# Patient Record
Sex: FEMALE | Race: OTHER
[De-identification: names, ages, dates, MRNs, and addresses within clinical notes are randomized per-mention and may not be internally consistent; named-entity substitution may affect disease eponyms.]

---

## 2018-11-29 ENCOUNTER — HOSPITAL ENCOUNTER (INPATIENT)
Dept: HOSPITAL 8 - OR | Age: 73
LOS: 1 days | Discharge: HOME HEALTH | DRG: 480 | End: 2018-11-30
Attending: HOSPITALIST | Admitting: HOSPITALIST
Payer: MEDICARE

## 2018-11-29 VITALS — HEIGHT: 65 IN | WEIGHT: 163.14 LBS | BODY MASS INDEX: 27.18 KG/M2

## 2018-11-29 VITALS — SYSTOLIC BLOOD PRESSURE: 127 MMHG | DIASTOLIC BLOOD PRESSURE: 77 MMHG

## 2018-11-29 VITALS — SYSTOLIC BLOOD PRESSURE: 117 MMHG | DIASTOLIC BLOOD PRESSURE: 52 MMHG

## 2018-11-29 DIAGNOSIS — Z88.1: ICD-10-CM

## 2018-11-29 DIAGNOSIS — M06.9: ICD-10-CM

## 2018-11-29 DIAGNOSIS — M89.8X8: ICD-10-CM

## 2018-11-29 DIAGNOSIS — I10: ICD-10-CM

## 2018-11-29 DIAGNOSIS — E44.0: ICD-10-CM

## 2018-11-29 DIAGNOSIS — G89.29: ICD-10-CM

## 2018-11-29 DIAGNOSIS — Z88.6: ICD-10-CM

## 2018-11-29 DIAGNOSIS — Z79.899: ICD-10-CM

## 2018-11-29 DIAGNOSIS — Z88.8: ICD-10-CM
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
## 2018-11-29 DIAGNOSIS — J96.01: ICD-10-CM

## 2018-11-29 DIAGNOSIS — Z88.5: ICD-10-CM

## 2018-11-29 DIAGNOSIS — F41.9: ICD-10-CM

## 2018-11-29 DIAGNOSIS — S72.001A: Primary | ICD-10-CM
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
## 2018-11-29 LAB
ALBUMIN SERPL-MCNC: 3 G/DL (ref 3.4–5)
ANION GAP SERPL CALC-SCNC: 8 MMOL/L (ref 5–15)
BASOPHILS # BLD AUTO: 0.02 X10^3/UL (ref 0–0.1)
BASOPHILS NFR BLD AUTO: 0 % (ref 0–1)
CALCIUM SERPL-MCNC: 8.7 MG/DL (ref 8.5–10.1)
CHLORIDE SERPL-SCNC: 109 MMOL/L (ref 98–107)
CREAT SERPL-MCNC: 0.47 MG/DL (ref 0.55–1.02)
EOSINOPHIL # BLD AUTO: 0.03 X10^3/UL (ref 0–0.4)
EOSINOPHIL NFR BLD AUTO: 0 % (ref 1–7)
ERYTHROCYTE [DISTWIDTH] IN BLOOD BY AUTOMATED COUNT: 14.4 % (ref 9.6–15.2)
EST. AVERAGE GLUCOSE BLD GHB EST-MCNC: 126 MG/DL (ref 0–126)
HBA1C MFR BLD: 6 % (ref 4.2–6.3)
LYMPHOCYTES # BLD AUTO: 1.8 X10^3/UL (ref 1–3.4)
LYMPHOCYTES NFR BLD AUTO: 19 % (ref 22–44)
MCH RBC QN AUTO: 29.2 PG (ref 27–34.8)
MCHC RBC AUTO-ENTMCNC: 33.5 G/DL (ref 32.4–35.8)
MCV RBC AUTO: 87.1 FL (ref 80–100)
MD: NO
MONOCYTES # BLD AUTO: 0.32 X10^3/UL (ref 0.2–0.8)
MONOCYTES NFR BLD AUTO: 3 % (ref 2–9)
NEUTROPHILS # BLD AUTO: 7.26 X10^3/UL (ref 1.8–6.8)
NEUTROPHILS NFR BLD AUTO: 77 % (ref 42–75)
PLATELET # BLD AUTO: 208 X10^3/UL (ref 130–400)
PMV BLD AUTO: 8.1 FL (ref 7.4–10.4)
RBC # BLD AUTO: 4.91 X10^6/UL (ref 3.82–5.3)
T4 FREE SERPL-MCNC: 1.2 NG/DL (ref 0.76–1.46)
TSH SERPL-ACNC: 0.95 MIU/L (ref 0.36–3.74)

## 2018-11-29 PROCEDURE — 83036 HEMOGLOBIN GLYCOSYLATED A1C: CPT

## 2018-11-29 PROCEDURE — 93005 ELECTROCARDIOGRAM TRACING: CPT

## 2018-11-29 PROCEDURE — 84443 ASSAY THYROID STIM HORMONE: CPT

## 2018-11-29 PROCEDURE — 83735 ASSAY OF MAGNESIUM: CPT

## 2018-11-29 PROCEDURE — C1769 GUIDE WIRE: HCPCS

## 2018-11-29 PROCEDURE — 0QH634Z INSERTION OF INTERNAL FIXATION DEVICE INTO RIGHT UPPER FEMUR, PERCUTANEOUS APPROACH: ICD-10-PCS | Performed by: ORTHOPAEDIC SURGERY

## 2018-11-29 PROCEDURE — 80061 LIPID PANEL: CPT

## 2018-11-29 PROCEDURE — 82040 ASSAY OF SERUM ALBUMIN: CPT

## 2018-11-29 PROCEDURE — C1713 ANCHOR/SCREW BN/BN,TIS/BN: HCPCS

## 2018-11-29 PROCEDURE — 71045 X-RAY EXAM CHEST 1 VIEW: CPT

## 2018-11-29 PROCEDURE — 36415 COLL VENOUS BLD VENIPUNCTURE: CPT

## 2018-11-29 PROCEDURE — 80053 COMPREHEN METABOLIC PANEL: CPT

## 2018-11-29 PROCEDURE — 84439 ASSAY OF FREE THYROXINE: CPT

## 2018-11-29 PROCEDURE — 80048 BASIC METABOLIC PNL TOTAL CA: CPT

## 2018-11-29 PROCEDURE — 76001: CPT

## 2018-11-29 PROCEDURE — 85025 COMPLETE CBC W/AUTO DIFF WBC: CPT

## 2018-11-29 RX ADMIN — FENTANYL CITRATE PRN MCG: 50 INJECTION INTRAMUSCULAR; INTRAVENOUS at 19:11

## 2018-11-29 RX ADMIN — FENTANYL CITRATE PRN MCG: 50 INJECTION INTRAMUSCULAR; INTRAVENOUS at 19:04

## 2018-11-29 RX ADMIN — SODIUM CHLORIDE SCH MLS/HR: 0.9 INJECTION, SOLUTION INTRAVENOUS at 15:14

## 2018-11-30 VITALS — SYSTOLIC BLOOD PRESSURE: 98 MMHG | DIASTOLIC BLOOD PRESSURE: 62 MMHG

## 2018-11-30 VITALS — SYSTOLIC BLOOD PRESSURE: 112 MMHG | DIASTOLIC BLOOD PRESSURE: 70 MMHG

## 2018-11-30 VITALS — SYSTOLIC BLOOD PRESSURE: 100 MMHG | DIASTOLIC BLOOD PRESSURE: 59 MMHG

## 2018-11-30 VITALS — DIASTOLIC BLOOD PRESSURE: 78 MMHG | SYSTOLIC BLOOD PRESSURE: 115 MMHG

## 2018-11-30 LAB
ALBUMIN SERPL-MCNC: 2.7 G/DL (ref 3.4–5)
ALP SERPL-CCNC: 54 U/L (ref 45–117)
ALT SERPL-CCNC: 35 U/L (ref 12–78)
ANION GAP SERPL CALC-SCNC: 8 MMOL/L (ref 5–15)
BASOPHILS # BLD AUTO: 0.01 X10^3/UL (ref 0–0.1)
BASOPHILS NFR BLD AUTO: 0 % (ref 0–1)
BILIRUB SERPL-MCNC: 0.5 MG/DL (ref 0.2–1)
CALCIUM SERPL-MCNC: 8.3 MG/DL (ref 8.5–10.1)
CHLORIDE SERPL-SCNC: 111 MMOL/L (ref 98–107)
CHOL/HDL RATIO: 3.4
CREAT SERPL-MCNC: 0.43 MG/DL (ref 0.55–1.02)
EOSINOPHIL # BLD AUTO: 0 X10^3/UL (ref 0–0.4)
EOSINOPHIL NFR BLD AUTO: 0 % (ref 1–7)
ERYTHROCYTE [DISTWIDTH] IN BLOOD BY AUTOMATED COUNT: 14.4 % (ref 9.6–15.2)
HDL CHOL %: 29 % (ref 28–40)
HDL CHOLESTEROL (DIRECT): 46 MG/DL (ref 40–60)
LDL CHOLESTEROL,CALCULATED: 102 MG/DL (ref 54–169)
LDLC/HDLC SERPL: 2.2 {RATIO} (ref 0.5–3)
LYMPHOCYTES # BLD AUTO: 0.78 X10^3/UL (ref 1–3.4)
LYMPHOCYTES NFR BLD AUTO: 11 % (ref 22–44)
MCH RBC QN AUTO: 29.8 PG (ref 27–34.8)
MCHC RBC AUTO-ENTMCNC: 34 G/DL (ref 32.4–35.8)
MCV RBC AUTO: 87.9 FL (ref 80–100)
MD: NO
MONOCYTES # BLD AUTO: 0.15 X10^3/UL (ref 0.2–0.8)
MONOCYTES NFR BLD AUTO: 2 % (ref 2–9)
NEUTROPHILS # BLD AUTO: 6.15 X10^3/UL (ref 1.8–6.8)
NEUTROPHILS NFR BLD AUTO: 87 % (ref 42–75)
PLATELET # BLD AUTO: 194 X10^3/UL (ref 130–400)
PMV BLD AUTO: 8.5 FL (ref 7.4–10.4)
PROT SERPL-MCNC: 6.9 G/DL (ref 6.4–8.2)
RBC # BLD AUTO: 4.45 X10^6/UL (ref 3.82–5.3)
TRIGL SERPL-MCNC: 50 MG/DL (ref 50–200)
VLDLC SERPL CALC-MCNC: 10 MG/DL (ref 0–25)

## 2018-11-30 RX ADMIN — CEFAZOLIN SCH MLS/HR: 1 INJECTION, POWDER, FOR SOLUTION INTRAVENOUS at 00:28

## 2018-11-30 RX ADMIN — CEFAZOLIN SCH MLS/HR: 1 INJECTION, POWDER, FOR SOLUTION INTRAVENOUS at 08:42

## 2018-11-30 RX ADMIN — SODIUM CHLORIDE SCH MLS/HR: 0.9 INJECTION, SOLUTION INTRAVENOUS at 05:40

## 2020-03-05 ENCOUNTER — HOSPITAL ENCOUNTER (EMERGENCY)
Dept: HOSPITAL 8 - ED | Age: 75
LOS: 1 days | Discharge: HOME | End: 2020-03-06
Payer: MEDICARE

## 2020-03-05 VITALS — BODY MASS INDEX: 27.55 KG/M2 | HEIGHT: 65 IN | WEIGHT: 165.35 LBS

## 2020-03-05 DIAGNOSIS — A09: Primary | ICD-10-CM

## 2020-03-05 DIAGNOSIS — I10: ICD-10-CM

## 2020-03-05 DIAGNOSIS — M06.9: ICD-10-CM

## 2020-03-05 DIAGNOSIS — R11.2: ICD-10-CM

## 2020-03-05 LAB
ALBUMIN SERPL-MCNC: 2.8 G/DL (ref 3.4–5)
ALP SERPL-CCNC: 72 U/L (ref 45–117)
ALT SERPL-CCNC: 35 U/L (ref 12–78)
ANION GAP SERPL CALC-SCNC: 5 MMOL/L (ref 5–15)
BASOPHILS # BLD AUTO: 0.01 X10^3/UL (ref 0–0.1)
BASOPHILS NFR BLD AUTO: 0 % (ref 0–1)
BILIRUB SERPL-MCNC: 1 MG/DL (ref 0.2–1)
CALCIUM SERPL-MCNC: 8.4 MG/DL (ref 8.5–10.1)
CHLORIDE SERPL-SCNC: 109 MMOL/L (ref 98–107)
CREAT SERPL-MCNC: 0.47 MG/DL (ref 0.55–1.02)
EOSINOPHIL # BLD AUTO: 0 X10^3/UL (ref 0–0.4)
EOSINOPHIL NFR BLD AUTO: 0 % (ref 1–7)
ERYTHROCYTE [DISTWIDTH] IN BLOOD BY AUTOMATED COUNT: 14.4 % (ref 9.6–15.2)
LYMPHOCYTES # BLD AUTO: 0.69 X10^3/UL (ref 1–3.4)
LYMPHOCYTES NFR BLD AUTO: 16 % (ref 22–44)
MCH RBC QN AUTO: 29.6 PG (ref 27–34.8)
MCHC RBC AUTO-ENTMCNC: 33.9 G/DL (ref 32.4–35.8)
MCV RBC AUTO: 87.4 FL (ref 80–100)
MD: NO
MONOCYTES # BLD AUTO: 0.23 X10^3/UL (ref 0.2–0.8)
MONOCYTES NFR BLD AUTO: 5 % (ref 2–9)
NEUTROPHILS # BLD AUTO: 3.44 X10^3/UL (ref 1.8–6.8)
NEUTROPHILS NFR BLD AUTO: 79 % (ref 42–75)
PLATELET # BLD AUTO: 188 X10^3/UL (ref 130–400)
PMV BLD AUTO: 7.7 FL (ref 7.4–10.4)
PROT SERPL-MCNC: 7.5 G/DL (ref 6.4–8.2)
RBC # BLD AUTO: 5.06 X10^6/UL (ref 3.82–5.3)

## 2020-03-05 PROCEDURE — 83690 ASSAY OF LIPASE: CPT

## 2020-03-05 PROCEDURE — 96374 THER/PROPH/DIAG INJ IV PUSH: CPT

## 2020-03-05 PROCEDURE — 36415 COLL VENOUS BLD VENIPUNCTURE: CPT

## 2020-03-05 PROCEDURE — 85025 COMPLETE CBC W/AUTO DIFF WBC: CPT

## 2020-03-05 PROCEDURE — 80053 COMPREHEN METABOLIC PANEL: CPT

## 2020-03-05 PROCEDURE — 96361 HYDRATE IV INFUSION ADD-ON: CPT

## 2020-03-05 PROCEDURE — 99283 EMERGENCY DEPT VISIT LOW MDM: CPT

## 2020-03-06 VITALS — SYSTOLIC BLOOD PRESSURE: 130 MMHG | DIASTOLIC BLOOD PRESSURE: 90 MMHG

## 2020-03-06 NOTE — NUR
Pt reports her nausea has greatly improved after IV Zofran and IVF. Pt was able 
to consume her GI cocktail without further N/V. Pt ambulated to bathroom and 
back.

## 2021-01-13 ENCOUNTER — HOSPITAL ENCOUNTER (OUTPATIENT)
Dept: HOSPITAL 8 - ED | Age: 76
Setting detail: OBSERVATION
Discharge: HOME | End: 2021-01-13
Attending: SURGERY | Admitting: SURGERY
Payer: MEDICARE

## 2021-01-13 VITALS — HEIGHT: 65 IN | BODY MASS INDEX: 27.55 KG/M2 | WEIGHT: 165.35 LBS

## 2021-01-13 VITALS — SYSTOLIC BLOOD PRESSURE: 151 MMHG | DIASTOLIC BLOOD PRESSURE: 96 MMHG

## 2021-01-13 DIAGNOSIS — Z20.822: ICD-10-CM

## 2021-01-13 DIAGNOSIS — K80.20: ICD-10-CM

## 2021-01-13 DIAGNOSIS — I10: ICD-10-CM

## 2021-01-13 DIAGNOSIS — Z79.899: ICD-10-CM

## 2021-01-13 DIAGNOSIS — M06.9: ICD-10-CM

## 2021-01-13 DIAGNOSIS — M81.0: ICD-10-CM

## 2021-01-13 DIAGNOSIS — K35.80: Primary | ICD-10-CM

## 2021-01-13 LAB
ALBUMIN SERPL-MCNC: 3.2 G/DL (ref 3.4–5)
ALP SERPL-CCNC: 86 U/L (ref 45–117)
ALT SERPL-CCNC: 35 U/L (ref 12–78)
ANION GAP SERPL CALC-SCNC: 7 MMOL/L (ref 5–15)
BASOPHILS # BLD AUTO: 0 X10^3/UL (ref 0–0.1)
BASOPHILS NFR BLD AUTO: 0 % (ref 0–1)
BILIRUB SERPL-MCNC: 1.4 MG/DL (ref 0.2–1)
CALCIUM SERPL-MCNC: 8.9 MG/DL (ref 8.5–10.1)
CHLORIDE SERPL-SCNC: 107 MMOL/L (ref 98–107)
CREAT SERPL-MCNC: 0.49 MG/DL (ref 0.55–1.02)
EOSINOPHIL # BLD AUTO: 0 X10^3/UL (ref 0–0.4)
EOSINOPHIL NFR BLD AUTO: 0 % (ref 1–7)
ERYTHROCYTE [DISTWIDTH] IN BLOOD BY AUTOMATED COUNT: 14 % (ref 9.6–15.2)
LYMPHOCYTES # BLD AUTO: 1.6 X10^3/UL (ref 1–3.4)
LYMPHOCYTES NFR BLD AUTO: 17 % (ref 22–44)
MCH RBC QN AUTO: 29.7 PG (ref 27–34.8)
MCHC RBC AUTO-ENTMCNC: 34.3 G/DL (ref 32.4–35.8)
MD: NO
MICROSCOPIC: (no result)
MONOCYTES # BLD AUTO: 0.6 X10^3/UL (ref 0.2–0.8)
MONOCYTES NFR BLD AUTO: 6 % (ref 2–9)
NEUTROPHILS # BLD AUTO: 7.4 X10^3/UL (ref 1.8–6.8)
NEUTROPHILS NFR BLD AUTO: 77 % (ref 42–75)
PLATELET # BLD AUTO: 214 X10^3/UL (ref 130–400)
PMV BLD AUTO: 8.3 FL (ref 7.4–10.4)
PROT SERPL-MCNC: 8.1 G/DL (ref 6.4–8.2)
RBC # BLD AUTO: 5.04 X10^6/UL (ref 3.82–5.3)

## 2021-01-13 PROCEDURE — 36415 COLL VENOUS BLD VENIPUNCTURE: CPT

## 2021-01-13 PROCEDURE — 88304 TISSUE EXAM BY PATHOLOGIST: CPT

## 2021-01-13 PROCEDURE — 87635 SARS-COV-2 COVID-19 AMP PRB: CPT

## 2021-01-13 PROCEDURE — 83690 ASSAY OF LIPASE: CPT

## 2021-01-13 PROCEDURE — 87086 URINE CULTURE/COLONY COUNT: CPT

## 2021-01-13 PROCEDURE — 85025 COMPLETE CBC W/AUTO DIFF WBC: CPT

## 2021-01-13 PROCEDURE — 74177 CT ABD & PELVIS W/CONTRAST: CPT

## 2021-01-13 PROCEDURE — 99285 EMERGENCY DEPT VISIT HI MDM: CPT

## 2021-01-13 PROCEDURE — 80053 COMPREHEN METABOLIC PANEL: CPT

## 2021-01-13 PROCEDURE — G0378 HOSPITAL OBSERVATION PER HR: HCPCS

## 2021-01-13 PROCEDURE — 93005 ELECTROCARDIOGRAM TRACING: CPT

## 2021-01-13 PROCEDURE — 81001 URINALYSIS AUTO W/SCOPE: CPT

## 2021-01-13 PROCEDURE — 44970 LAPAROSCOPY APPENDECTOMY: CPT

## 2021-01-13 PROCEDURE — 96365 THER/PROPH/DIAG IV INF INIT: CPT

## 2021-01-13 RX ADMIN — OXYCODONE HYDROCHLORIDE PRN MG: 5 SOLUTION ORAL at 08:39

## 2021-01-13 RX ADMIN — FENTANYL CITRATE PRN MCG: 50 INJECTION INTRAMUSCULAR; INTRAVENOUS at 09:10

## 2021-01-13 RX ADMIN — OXYCODONE HYDROCHLORIDE PRN MG: 5 SOLUTION ORAL at 09:14

## 2021-01-13 RX ADMIN — FENTANYL CITRATE PRN MCG: 50 INJECTION INTRAMUSCULAR; INTRAVENOUS at 08:17

## 2021-01-13 RX ADMIN — FENTANYL CITRATE PRN MCG: 50 INJECTION INTRAMUSCULAR; INTRAVENOUS at 08:42

## 2021-01-13 NOTE — NUR
pt came into ed tonight via [facility] for abdominal pain, gassy and cramping for 3 
days. pt denies vomiting/diarrhea but states "i have an upset stomach" denies 
gu changes. pt states she has a hx of gastritis, no abdominal sx history. lower 
quadrant pain, primarily on the right side. wctm



pt placed on spo2/bp/ecg monitoring. 



chary james at bs for eval and poc. 



assisted to restroom to obtain urine sample at this time.

## 2021-01-13 NOTE — NUR
BREAK RN: PT. RETURNED FROM CT; TO BR VIA W/C AND BACK TO [facility].  ALL MONITORS 
REPLACED.  REPORT BACK TO SHARAD SHARPE TO RE-ASSUME CARE OF PT.

## 2021-01-15 DIAGNOSIS — Z23 NEED FOR VACCINATION: ICD-10-CM

## 2021-01-28 ENCOUNTER — HOSPITAL ENCOUNTER (INPATIENT)
Facility: MEDICAL CENTER | Age: 76
LOS: 8 days | DRG: 862 | End: 2021-02-05
Attending: EMERGENCY MEDICINE | Admitting: STUDENT IN AN ORGANIZED HEALTH CARE EDUCATION/TRAINING PROGRAM
Payer: MEDICARE

## 2021-01-28 ENCOUNTER — APPOINTMENT (OUTPATIENT)
Dept: RADIOLOGY | Facility: MEDICAL CENTER | Age: 76
DRG: 862 | End: 2021-01-28
Attending: EMERGENCY MEDICINE
Payer: MEDICARE

## 2021-01-28 DIAGNOSIS — T81.43XA POSTPROCEDURAL INTRAABDOMINAL ABSCESS: ICD-10-CM

## 2021-01-28 DIAGNOSIS — T81.43XA POSTOPERATIVE INTRA-ABDOMINAL ABSCESS: ICD-10-CM

## 2021-01-28 LAB
ALBUMIN SERPL BCP-MCNC: 2.7 G/DL (ref 3.2–4.9)
ALBUMIN/GLOB SERPL: 0.7 G/DL
ALP SERPL-CCNC: 100 U/L (ref 30–99)
ALT SERPL-CCNC: 14 U/L (ref 2–50)
ANION GAP SERPL CALC-SCNC: 10 MMOL/L (ref 7–16)
APPEARANCE UR: CLEAR
AST SERPL-CCNC: 20 U/L (ref 12–45)
BASOPHILS # BLD AUTO: 0.2 % (ref 0–1.8)
BASOPHILS # BLD: 0.03 K/UL (ref 0–0.12)
BILIRUB SERPL-MCNC: 0.6 MG/DL (ref 0.1–1.5)
BUN SERPL-MCNC: 6 MG/DL (ref 8–22)
CALCIUM SERPL-MCNC: 8.5 MG/DL (ref 8.5–10.5)
CHLORIDE SERPL-SCNC: 102 MMOL/L (ref 96–112)
CO2 SERPL-SCNC: 21 MMOL/L (ref 20–33)
COLOR UR AUTO: YELLOW
CREAT SERPL-MCNC: 0.29 MG/DL (ref 0.5–1.4)
EOSINOPHIL # BLD AUTO: 0.02 K/UL (ref 0–0.51)
EOSINOPHIL NFR BLD: 0.2 % (ref 0–6.9)
ERYTHROCYTE [DISTWIDTH] IN BLOOD BY AUTOMATED COUNT: 43.8 FL (ref 35.9–50)
GLOBULIN SER CALC-MCNC: 4 G/DL (ref 1.9–3.5)
GLUCOSE SERPL-MCNC: 113 MG/DL (ref 65–99)
GLUCOSE UR QL STRIP.AUTO: NEGATIVE MG/DL
HCG UR QL: NEGATIVE
HCT VFR BLD AUTO: 38.7 % (ref 37–47)
HGB BLD-MCNC: 12.9 G/DL (ref 12–16)
IMM GRANULOCYTES # BLD AUTO: 0.08 K/UL (ref 0–0.11)
IMM GRANULOCYTES NFR BLD AUTO: 0.6 % (ref 0–0.9)
KETONES UR QL STRIP.AUTO: NEGATIVE MG/DL
LEUKOCYTE ESTERASE UR QL STRIP.AUTO: ABNORMAL
LYMPHOCYTES # BLD AUTO: 1.88 K/UL (ref 1–4.8)
LYMPHOCYTES NFR BLD: 15.1 % (ref 22–41)
MCH RBC QN AUTO: 28.4 PG (ref 27–33)
MCHC RBC AUTO-ENTMCNC: 33.3 G/DL (ref 33.6–35)
MCV RBC AUTO: 85.2 FL (ref 81.4–97.8)
MONOCYTES # BLD AUTO: 0.85 K/UL (ref 0–0.85)
MONOCYTES NFR BLD AUTO: 6.8 % (ref 0–13.4)
NEUTROPHILS # BLD AUTO: 9.63 K/UL (ref 2–7.15)
NEUTROPHILS NFR BLD: 77.1 % (ref 44–72)
NITRITE UR QL STRIP.AUTO: NEGATIVE
NRBC # BLD AUTO: 0 K/UL
NRBC BLD-RTO: 0 /100 WBC
PH UR STRIP.AUTO: 6 [PH] (ref 5–8)
PLATELET # BLD AUTO: 402 K/UL (ref 164–446)
PMV BLD AUTO: 9 FL (ref 9–12.9)
POTASSIUM SERPL-SCNC: 3.1 MMOL/L (ref 3.6–5.5)
PROT SERPL-MCNC: 6.7 G/DL (ref 6–8.2)
PROT UR QL STRIP: NEGATIVE MG/DL
RBC # BLD AUTO: 4.54 M/UL (ref 4.2–5.4)
RBC UR QL AUTO: NEGATIVE
SARS-COV+SARS-COV-2 AG RESP QL IA.RAPID: DETECTED
SODIUM SERPL-SCNC: 133 MMOL/L (ref 135–145)
SP GR UR STRIP.AUTO: 1.01 (ref 1–1.03)
SPECIMEN SOURCE: ABNORMAL
WBC # BLD AUTO: 12.5 K/UL (ref 4.8–10.8)

## 2021-01-28 PROCEDURE — 700105 HCHG RX REV CODE 258: Performed by: EMERGENCY MEDICINE

## 2021-01-28 PROCEDURE — 82728 ASSAY OF FERRITIN: CPT

## 2021-01-28 PROCEDURE — 80053 COMPREHEN METABOLIC PANEL: CPT

## 2021-01-28 PROCEDURE — 36415 COLL VENOUS BLD VENIPUNCTURE: CPT

## 2021-01-28 PROCEDURE — 83880 ASSAY OF NATRIURETIC PEPTIDE: CPT

## 2021-01-28 PROCEDURE — 85025 COMPLETE CBC W/AUTO DIFF WBC: CPT

## 2021-01-28 PROCEDURE — 99285 EMERGENCY DEPT VISIT HI MDM: CPT

## 2021-01-28 PROCEDURE — 87426 SARSCOV CORONAVIRUS AG IA: CPT

## 2021-01-28 PROCEDURE — 700111 HCHG RX REV CODE 636 W/ 250 OVERRIDE (IP): Performed by: EMERGENCY MEDICINE

## 2021-01-28 PROCEDURE — 74176 CT ABD & PELVIS W/O CONTRAST: CPT

## 2021-01-28 PROCEDURE — 81025 URINE PREGNANCY TEST: CPT

## 2021-01-28 PROCEDURE — 96365 THER/PROPH/DIAG IV INF INIT: CPT

## 2021-01-28 PROCEDURE — 85379 FIBRIN DEGRADATION QUANT: CPT

## 2021-01-28 PROCEDURE — 83735 ASSAY OF MAGNESIUM: CPT

## 2021-01-28 PROCEDURE — C9803 HOPD COVID-19 SPEC COLLECT: HCPCS | Performed by: EMERGENCY MEDICINE

## 2021-01-28 PROCEDURE — 770020 HCHG ROOM/CARE - TELE (206)

## 2021-01-28 PROCEDURE — 81002 URINALYSIS NONAUTO W/O SCOPE: CPT

## 2021-01-28 PROCEDURE — 84145 PROCALCITONIN (PCT): CPT

## 2021-01-28 RX ORDER — IBUPROFEN 800 MG/1
800 TABLET ORAL
COMMUNITY

## 2021-01-28 RX ORDER — ERYTHROMYCIN 5 MG/G
1 OINTMENT OPHTHALMIC
COMMUNITY

## 2021-01-28 RX ORDER — SODIUM CHLORIDE 9 MG/ML
1000 INJECTION, SOLUTION INTRAVENOUS ONCE
Status: COMPLETED | OUTPATIENT
Start: 2021-01-28 | End: 2021-01-29

## 2021-01-28 RX ORDER — LISINOPRIL AND HYDROCHLOROTHIAZIDE 12.5; 1 MG/1; MG/1
TABLET ORAL
COMMUNITY
End: 2021-01-28

## 2021-01-28 RX ORDER — POTASSIUM CHLORIDE 20 MEQ/1
40 TABLET, EXTENDED RELEASE ORAL ONCE
Status: COMPLETED | OUTPATIENT
Start: 2021-01-28 | End: 2021-01-29

## 2021-01-28 RX ORDER — ZOLPIDEM TARTRATE 5 MG/1
TABLET ORAL
COMMUNITY
End: 2021-01-28

## 2021-01-28 RX ADMIN — PIPERACILLIN AND TAZOBACTAM 4.5 G: 4; .5 INJECTION, POWDER, LYOPHILIZED, FOR SOLUTION INTRAVENOUS; PARENTERAL at 22:07

## 2021-01-28 RX ADMIN — SODIUM CHLORIDE 1000 ML: 9 INJECTION, SOLUTION INTRAVENOUS at 22:08

## 2021-01-28 ASSESSMENT — PAIN DESCRIPTION - PAIN TYPE: TYPE: ACUTE PAIN

## 2021-01-29 ENCOUNTER — APPOINTMENT (OUTPATIENT)
Dept: RADIOLOGY | Facility: MEDICAL CENTER | Age: 76
DRG: 862 | End: 2021-01-29
Attending: STUDENT IN AN ORGANIZED HEALTH CARE EDUCATION/TRAINING PROGRAM
Payer: MEDICARE

## 2021-01-29 ENCOUNTER — APPOINTMENT (OUTPATIENT)
Dept: RADIOLOGY | Facility: MEDICAL CENTER | Age: 76
DRG: 862 | End: 2021-01-29
Attending: INTERNAL MEDICINE
Payer: MEDICARE

## 2021-01-29 ENCOUNTER — APPOINTMENT (OUTPATIENT)
Dept: CARDIOLOGY | Facility: MEDICAL CENTER | Age: 76
DRG: 862 | End: 2021-01-29
Attending: STUDENT IN AN ORGANIZED HEALTH CARE EDUCATION/TRAINING PROGRAM
Payer: MEDICARE

## 2021-01-29 PROBLEM — U07.1 COVID-19: Status: ACTIVE | Noted: 2021-01-29

## 2021-01-29 PROBLEM — N20.0 RENAL STONE: Status: ACTIVE | Noted: 2021-01-29

## 2021-01-29 PROBLEM — E87.6 HYPOKALEMIA: Status: ACTIVE | Noted: 2021-01-29

## 2021-01-29 PROBLEM — T81.43XA POSTPROCEDURAL INTRAABDOMINAL ABSCESS: Status: ACTIVE | Noted: 2021-01-29

## 2021-01-29 LAB
ALBUMIN SERPL BCP-MCNC: 2.8 G/DL (ref 3.2–4.9)
ALBUMIN/GLOB SERPL: 0.7 G/DL
ALP SERPL-CCNC: 104 U/L (ref 30–99)
ALT SERPL-CCNC: 12 U/L (ref 2–50)
ANION GAP SERPL CALC-SCNC: 12 MMOL/L (ref 7–16)
AST SERPL-CCNC: 18 U/L (ref 12–45)
BILIRUB SERPL-MCNC: 0.9 MG/DL (ref 0.1–1.5)
BUN SERPL-MCNC: 5 MG/DL (ref 8–22)
C DIFF DNA SPEC QL NAA+PROBE: NEGATIVE
C DIFF TOX GENS STL QL NAA+PROBE: NEGATIVE
CALCIUM SERPL-MCNC: 8.6 MG/DL (ref 8.5–10.5)
CHLORIDE SERPL-SCNC: 108 MMOL/L (ref 96–112)
CO2 SERPL-SCNC: 19 MMOL/L (ref 20–33)
CREAT SERPL-MCNC: 0.25 MG/DL (ref 0.5–1.4)
D DIMER PPP IA.FEU-MCNC: >20 UG/ML (FEU) (ref 0–0.5)
EKG IMPRESSION: NORMAL
ERYTHROCYTE [DISTWIDTH] IN BLOOD BY AUTOMATED COUNT: 43.8 FL (ref 35.9–50)
FERRITIN SERPL-MCNC: 245 NG/ML (ref 10–291)
GLOBULIN SER CALC-MCNC: 4 G/DL (ref 1.9–3.5)
GLUCOSE SERPL-MCNC: 121 MG/DL (ref 65–99)
HCT VFR BLD AUTO: 40.9 % (ref 37–47)
HGB BLD-MCNC: 13.5 G/DL (ref 12–16)
INR PPP: 1.07 (ref 0.87–1.13)
MAGNESIUM SERPL-MCNC: 2 MG/DL (ref 1.5–2.5)
MCH RBC QN AUTO: 28.1 PG (ref 27–33)
MCHC RBC AUTO-ENTMCNC: 33 G/DL (ref 33.6–35)
MCV RBC AUTO: 85.2 FL (ref 81.4–97.8)
NT-PROBNP SERPL IA-MCNC: 367 PG/ML (ref 0–125)
PLATELET # BLD AUTO: 401 K/UL (ref 164–446)
PMV BLD AUTO: 9.1 FL (ref 9–12.9)
POTASSIUM SERPL-SCNC: 3.4 MMOL/L (ref 3.6–5.5)
PROCALCITONIN SERPL-MCNC: <0.05 NG/ML
PROT SERPL-MCNC: 6.8 G/DL (ref 6–8.2)
PROTHROMBIN TIME: 14.3 SEC (ref 12–14.6)
RBC # BLD AUTO: 4.8 M/UL (ref 4.2–5.4)
SODIUM SERPL-SCNC: 139 MMOL/L (ref 135–145)
WBC # BLD AUTO: 13 K/UL (ref 4.8–10.8)

## 2021-01-29 PROCEDURE — 700102 HCHG RX REV CODE 250 W/ 637 OVERRIDE(OP): Performed by: STUDENT IN AN ORGANIZED HEALTH CARE EDUCATION/TRAINING PROGRAM

## 2021-01-29 PROCEDURE — 87899 AGENT NOS ASSAY W/OPTIC: CPT | Mod: 91

## 2021-01-29 PROCEDURE — 87070 CULTURE OTHR SPECIMN AEROBIC: CPT | Mod: 91

## 2021-01-29 PROCEDURE — 700101 HCHG RX REV CODE 250: Performed by: STUDENT IN AN ORGANIZED HEALTH CARE EDUCATION/TRAINING PROGRAM

## 2021-01-29 PROCEDURE — 700111 HCHG RX REV CODE 636 W/ 250 OVERRIDE (IP): Performed by: STUDENT IN AN ORGANIZED HEALTH CARE EDUCATION/TRAINING PROGRAM

## 2021-01-29 PROCEDURE — 93970 EXTREMITY STUDY: CPT

## 2021-01-29 PROCEDURE — 0W9G30Z DRAINAGE OF PERITONEAL CAVITY WITH DRAINAGE DEVICE, PERCUTANEOUS APPROACH: ICD-10-PCS | Performed by: RADIOLOGY

## 2021-01-29 PROCEDURE — A9270 NON-COVERED ITEM OR SERVICE: HCPCS | Performed by: STUDENT IN AN ORGANIZED HEALTH CARE EDUCATION/TRAINING PROGRAM

## 2021-01-29 PROCEDURE — 93005 ELECTROCARDIOGRAM TRACING: CPT | Performed by: STUDENT IN AN ORGANIZED HEALTH CARE EDUCATION/TRAINING PROGRAM

## 2021-01-29 PROCEDURE — 36415 COLL VENOUS BLD VENIPUNCTURE: CPT

## 2021-01-29 PROCEDURE — 49406 IMAGE CATH FLUID PERI/RETRO: CPT

## 2021-01-29 PROCEDURE — 700105 HCHG RX REV CODE 258: Performed by: STUDENT IN AN ORGANIZED HEALTH CARE EDUCATION/TRAINING PROGRAM

## 2021-01-29 PROCEDURE — 93010 ELECTROCARDIOGRAM REPORT: CPT | Performed by: INTERNAL MEDICINE

## 2021-01-29 PROCEDURE — 85027 COMPLETE CBC AUTOMATED: CPT

## 2021-01-29 PROCEDURE — 80053 COMPREHEN METABOLIC PANEL: CPT

## 2021-01-29 PROCEDURE — 700111 HCHG RX REV CODE 636 W/ 250 OVERRIDE (IP): Performed by: RADIOLOGY

## 2021-01-29 PROCEDURE — 85610 PROTHROMBIN TIME: CPT

## 2021-01-29 PROCEDURE — 770020 HCHG ROOM/CARE - TELE (206)

## 2021-01-29 PROCEDURE — 99153 MOD SED SAME PHYS/QHP EA: CPT

## 2021-01-29 PROCEDURE — 99222 1ST HOSP IP/OBS MODERATE 55: CPT | Mod: AI | Performed by: STUDENT IN AN ORGANIZED HEALTH CARE EDUCATION/TRAINING PROGRAM

## 2021-01-29 PROCEDURE — 87205 SMEAR GRAM STAIN: CPT | Mod: 91

## 2021-01-29 PROCEDURE — 0W9J30Z DRAINAGE OF PELVIC CAVITY WITH DRAINAGE DEVICE, PERCUTANEOUS APPROACH: ICD-10-PCS | Performed by: RADIOLOGY

## 2021-01-29 PROCEDURE — 87493 C DIFF AMPLIFIED PROBE: CPT

## 2021-01-29 PROCEDURE — 87040 BLOOD CULTURE FOR BACTERIA: CPT | Mod: 91

## 2021-01-29 PROCEDURE — 700111 HCHG RX REV CODE 636 W/ 250 OVERRIDE (IP)

## 2021-01-29 PROCEDURE — 87045 FECES CULTURE AEROBIC BACT: CPT

## 2021-01-29 PROCEDURE — 76775 US EXAM ABDO BACK WALL LIM: CPT

## 2021-01-29 PROCEDURE — 87186 SC STD MICRODIL/AGAR DIL: CPT

## 2021-01-29 PROCEDURE — 87077 CULTURE AEROBIC IDENTIFY: CPT

## 2021-01-29 PROCEDURE — 302146: Performed by: INTERNAL MEDICINE

## 2021-01-29 RX ORDER — ASCORBIC ACID 500 MG
500 TABLET ORAL DAILY
Status: DISCONTINUED | OUTPATIENT
Start: 2021-01-29 | End: 2021-01-29

## 2021-01-29 RX ORDER — ONDANSETRON 2 MG/ML
4 INJECTION INTRAMUSCULAR; INTRAVENOUS EVERY 4 HOURS PRN
Status: DISCONTINUED | OUTPATIENT
Start: 2021-01-29 | End: 2021-02-05 | Stop reason: HOSPADM

## 2021-01-29 RX ORDER — BENZONATATE 100 MG/1
100 CAPSULE ORAL 3 TIMES DAILY PRN
Status: DISCONTINUED | OUTPATIENT
Start: 2021-01-29 | End: 2021-02-05 | Stop reason: HOSPADM

## 2021-01-29 RX ORDER — POLYETHYLENE GLYCOL 3350 17 G/17G
1 POWDER, FOR SOLUTION ORAL
Status: DISCONTINUED | OUTPATIENT
Start: 2021-01-29 | End: 2021-02-05 | Stop reason: HOSPADM

## 2021-01-29 RX ORDER — LABETALOL HYDROCHLORIDE 5 MG/ML
10 INJECTION, SOLUTION INTRAVENOUS EVERY 4 HOURS PRN
Status: DISCONTINUED | OUTPATIENT
Start: 2021-01-29 | End: 2021-01-30

## 2021-01-29 RX ORDER — AMOXICILLIN 250 MG
2 CAPSULE ORAL 2 TIMES DAILY
Status: DISCONTINUED | OUTPATIENT
Start: 2021-01-29 | End: 2021-02-05 | Stop reason: HOSPADM

## 2021-01-29 RX ORDER — SODIUM CHLORIDE 9 MG/ML
500 INJECTION, SOLUTION INTRAVENOUS
Status: ACTIVE | OUTPATIENT
Start: 2021-01-29 | End: 2021-01-29

## 2021-01-29 RX ORDER — MIDAZOLAM HYDROCHLORIDE 1 MG/ML
.5-2 INJECTION INTRAMUSCULAR; INTRAVENOUS PRN
Status: ACTIVE | OUTPATIENT
Start: 2021-01-29 | End: 2021-01-29

## 2021-01-29 RX ORDER — HEPARIN SODIUM 5000 [USP'U]/ML
5000 INJECTION, SOLUTION INTRAVENOUS; SUBCUTANEOUS EVERY 8 HOURS
Status: COMPLETED | OUTPATIENT
Start: 2021-01-29 | End: 2021-01-29

## 2021-01-29 RX ORDER — GUAIFENESIN 600 MG/1
600 TABLET, EXTENDED RELEASE ORAL EVERY 12 HOURS
Status: DISCONTINUED | OUTPATIENT
Start: 2021-01-29 | End: 2021-02-05 | Stop reason: HOSPADM

## 2021-01-29 RX ORDER — BISACODYL 10 MG
10 SUPPOSITORY, RECTAL RECTAL
Status: DISCONTINUED | OUTPATIENT
Start: 2021-01-29 | End: 2021-02-05 | Stop reason: HOSPADM

## 2021-01-29 RX ORDER — MORPHINE SULFATE 4 MG/ML
1 INJECTION, SOLUTION INTRAMUSCULAR; INTRAVENOUS EVERY 4 HOURS PRN
Status: DISCONTINUED | OUTPATIENT
Start: 2021-01-29 | End: 2021-02-05 | Stop reason: HOSPADM

## 2021-01-29 RX ORDER — MIDAZOLAM HYDROCHLORIDE 1 MG/ML
INJECTION INTRAMUSCULAR; INTRAVENOUS
Status: COMPLETED
Start: 2021-01-29 | End: 2021-01-29

## 2021-01-29 RX ORDER — ONDANSETRON 4 MG/1
4 TABLET, ORALLY DISINTEGRATING ORAL EVERY 4 HOURS PRN
Status: DISCONTINUED | OUTPATIENT
Start: 2021-01-29 | End: 2021-02-05 | Stop reason: HOSPADM

## 2021-01-29 RX ORDER — ONDANSETRON 2 MG/ML
4 INJECTION INTRAMUSCULAR; INTRAVENOUS PRN
Status: ACTIVE | OUTPATIENT
Start: 2021-01-29 | End: 2021-01-29

## 2021-01-29 RX ORDER — ACETAMINOPHEN 325 MG/1
975 TABLET ORAL ONCE
Status: COMPLETED | OUTPATIENT
Start: 2021-01-29 | End: 2021-01-29

## 2021-01-29 RX ORDER — POTASSIUM CHLORIDE 7.45 MG/ML
10 INJECTION INTRAVENOUS
Status: DISPENSED | OUTPATIENT
Start: 2021-01-29 | End: 2021-01-29

## 2021-01-29 RX ORDER — ZINC SULFATE 50(220)MG
220 CAPSULE ORAL DAILY
Status: DISCONTINUED | OUTPATIENT
Start: 2021-01-29 | End: 2021-01-29

## 2021-01-29 RX ADMIN — HEPARIN SODIUM 5000 UNITS: 5000 INJECTION, SOLUTION INTRAVENOUS; SUBCUTANEOUS at 01:12

## 2021-01-29 RX ADMIN — LABETALOL HYDROCHLORIDE 10 MG: 5 INJECTION, SOLUTION INTRAVENOUS at 22:41

## 2021-01-29 RX ADMIN — ACETAMINOPHEN 975 MG: 325 TABLET, FILM COATED ORAL at 23:00

## 2021-01-29 RX ADMIN — MIDAZOLAM HYDROCHLORIDE 2 MG: 1 INJECTION, SOLUTION INTRAMUSCULAR; INTRAVENOUS at 18:40

## 2021-01-29 RX ADMIN — PIPERACILLIN AND TAZOBACTAM 4.5 G: 4; .5 INJECTION, POWDER, LYOPHILIZED, FOR SOLUTION INTRAVENOUS; PARENTERAL at 20:06

## 2021-01-29 RX ADMIN — FENTANYL CITRATE 50 MCG: 50 INJECTION, SOLUTION INTRAMUSCULAR; INTRAVENOUS at 18:40

## 2021-01-29 RX ADMIN — FENTANYL CITRATE 50 MCG: 50 INJECTION, SOLUTION INTRAMUSCULAR; INTRAVENOUS at 19:19

## 2021-01-29 RX ADMIN — OXYCODONE HYDROCHLORIDE AND ACETAMINOPHEN 500 MG: 500 TABLET ORAL at 05:24

## 2021-01-29 RX ADMIN — GUAIFENESIN 600 MG: 600 TABLET, EXTENDED RELEASE ORAL at 05:24

## 2021-01-29 RX ADMIN — PIPERACILLIN AND TAZOBACTAM 4.5 G: 4; .5 INJECTION, POWDER, LYOPHILIZED, FOR SOLUTION INTRAVENOUS; PARENTERAL at 10:24

## 2021-01-29 RX ADMIN — PIPERACILLIN AND TAZOBACTAM 4.5 G: 4; .5 INJECTION, POWDER, LYOPHILIZED, FOR SOLUTION INTRAVENOUS; PARENTERAL at 06:06

## 2021-01-29 RX ADMIN — FENTANYL CITRATE 50 MCG: 50 INJECTION, SOLUTION INTRAMUSCULAR; INTRAVENOUS at 19:14

## 2021-01-29 RX ADMIN — ONDANSETRON 4 MG: 2 INJECTION INTRAMUSCULAR; INTRAVENOUS at 09:20

## 2021-01-29 RX ADMIN — FENTANYL CITRATE 50 MCG: 50 INJECTION, SOLUTION INTRAMUSCULAR; INTRAVENOUS at 18:55

## 2021-01-29 RX ADMIN — POTASSIUM CHLORIDE 10 MEQ: 7.46 INJECTION, SOLUTION INTRAVENOUS at 01:12

## 2021-01-29 RX ADMIN — MIDAZOLAM HYDROCHLORIDE 2 MG: 1 INJECTION, SOLUTION INTRAMUSCULAR; INTRAVENOUS at 18:48

## 2021-01-29 RX ADMIN — POTASSIUM CHLORIDE 40 MEQ: 1500 TABLET, EXTENDED RELEASE ORAL at 01:18

## 2021-01-29 RX ADMIN — ZINC SULFATE 220 MG (50 MG) CAPSULE 220 MG: CAPSULE at 05:24

## 2021-01-29 ASSESSMENT — LIFESTYLE VARIABLES
TOTAL SCORE: 0
TOTAL SCORE: 0
EVER FELT BAD OR GUILTY ABOUT YOUR DRINKING: NO
ON A TYPICAL DAY WHEN YOU DRINK ALCOHOL HOW MANY DRINKS DO YOU HAVE: 0
HAVE PEOPLE ANNOYED YOU BY CRITICIZING YOUR DRINKING: NO
EVER HAD A DRINK FIRST THING IN THE MORNING TO STEADY YOUR NERVES TO GET RID OF A HANGOVER: NO
AVERAGE NUMBER OF DAYS PER WEEK YOU HAVE A DRINK CONTAINING ALCOHOL: 0
TOTAL SCORE: 0
HAVE YOU EVER FELT YOU SHOULD CUT DOWN ON YOUR DRINKING: NO
HOW MANY TIMES IN THE PAST YEAR HAVE YOU HAD 5 OR MORE DRINKS IN A DAY: 0
CONSUMPTION TOTAL: NEGATIVE
ALCOHOL_USE: NO

## 2021-01-29 ASSESSMENT — COGNITIVE AND FUNCTIONAL STATUS - GENERAL
MOBILITY SCORE: 19
TOILETING: A LITTLE
SUGGESTED CMS G CODE MODIFIER DAILY ACTIVITY: CI
STANDING UP FROM CHAIR USING ARMS: A LITTLE
WALKING IN HOSPITAL ROOM: A LITTLE
MOVING FROM LYING ON BACK TO SITTING ON SIDE OF FLAT BED: A LITTLE
CLIMB 3 TO 5 STEPS WITH RAILING: A LITTLE
MOVING TO AND FROM BED TO CHAIR: A LITTLE
SUGGESTED CMS G CODE MODIFIER MOBILITY: CK
DAILY ACTIVITIY SCORE: 23

## 2021-01-29 ASSESSMENT — PAIN DESCRIPTION - PAIN TYPE
TYPE: ACUTE PAIN

## 2021-01-29 ASSESSMENT — PATIENT HEALTH QUESTIONNAIRE - PHQ9
1. LITTLE INTEREST OR PLEASURE IN DOING THINGS: SEVERAL DAYS
6. FEELING BAD ABOUT YOURSELF - OR THAT YOU ARE A FAILURE OR HAVE LET YOURSELF OR YOUR FAMILY DOWN: NOT AL ALL
4. FEELING TIRED OR HAVING LITTLE ENERGY: SEVERAL DAYS
9. THOUGHTS THAT YOU WOULD BE BETTER OFF DEAD, OR OF HURTING YOURSELF: NOT AT ALL
SUM OF ALL RESPONSES TO PHQ QUESTIONS 1-9: 5
SUM OF ALL RESPONSES TO PHQ9 QUESTIONS 1 AND 2: 2
5. POOR APPETITE OR OVEREATING: SEVERAL DAYS
8. MOVING OR SPEAKING SO SLOWLY THAT OTHER PEOPLE COULD HAVE NOTICED. OR THE OPPOSITE, BEING SO FIGETY OR RESTLESS THAT YOU HAVE BEEN MOVING AROUND A LOT MORE THAN USUAL: NOT AT ALL
7. TROUBLE CONCENTRATING ON THINGS, SUCH AS READING THE NEWSPAPER OR WATCHING TELEVISION: SEVERAL DAYS
2. FEELING DOWN, DEPRESSED, IRRITABLE, OR HOPELESS: SEVERAL DAYS
3. TROUBLE FALLING OR STAYING ASLEEP OR SLEEPING TOO MUCH: NOT AT ALL

## 2021-01-29 ASSESSMENT — FIBROSIS 4 INDEX
FIB4 SCORE: 0.98
FIB4 SCORE: 0.96
FIB4 SCORE: 0.96
FIB4 SCORE: 0.98

## 2021-01-29 NOTE — PROGRESS NOTES
2 RN skin check complete.   Devices in place N/A.  Skin assessed under devices N/A.  Confirmed pressure ulcers found on N/A.  New potential pressure ulcers noted on N/A. Wound consult placed N/A.  The following interventions in place:pt turns and repositions self, pillows in use for support. General skin C/D/I and blanching.

## 2021-01-29 NOTE — ED NOTES
Pt states she has had 4 family members die in the last few months. 2 of COVID and 2 of other causes. She states she is under immense stress right now most certainly understandably.

## 2021-01-29 NOTE — ASSESSMENT & PLAN NOTE
2 mm calcification posterior to the bladder on the left with mild left urinary outflow tract obstructive changes, appearance suggests ureterovesicular junction stone.  Renal Ultrasound demonstrates mild hydro  Discussed with Dr. Brown, urology, ok to follow up as outpatient as long as not septic stone    UA negative for infection

## 2021-01-29 NOTE — ED TRIAGE NOTES
Patient brought in by EMS with complaints of abdominal pain for the past 2 weeks.  Patient had appendectomy 2 weeks ago.  Pain has become increasingly worse during that time.  Last BM yesterday and had little output.  Also complaining of burning with urination.  Patient received 600mg ibuprofen prior to arrival.

## 2021-01-29 NOTE — CARE PLAN
Problem: Communication  Goal: The ability to communicate needs accurately and effectively will improve  Outcome: PROGRESSING AS EXPECTED     Problem: Safety  Goal: Will remain free from falls  Outcome: PROGRESSING AS EXPECTED     Problem: Bowel/Gastric:  Goal: Normal bowel function is maintained or improved  Outcome: PROGRESSING SLOWER THAN EXPECTED     Problem: Knowledge Deficit  Goal: Knowledge of disease process/condition, treatment plan, diagnostic tests, and medications will improve  Outcome: PROGRESSING AS EXPECTED

## 2021-01-29 NOTE — ED PROVIDER NOTES
ED Provider Note    CHIEF COMPLAINT  Chief Complaint   Patient presents with   • Abdominal Pain       HPI  Rahul Blanco is a 75 y.o. female here for evaluation of lower abdominal pain.  The pt states that she had her appendix out two weeks ago, at [place], with Dr. Stoll.  She states she has had persistent pain since that time.  She has no vomiting and no fevers, but does c/o nausea.   She also c/o some dysuria, and frequency.  She states the pain stays in the right side, and radiates to the lower 'bladder' area.  No cp, no sob.  Nothing alleviates or exacerbates her symptoms.        ROS  See HPI for further details, o/w negative.     PAST MEDICAL HISTORY   has a past medical history of Anxiety, Blood transfusion, Chronic ear infection, Depression, Heart murmur, Hypertension (7/14/2011), Migraine, Rheumatoid arthritis(714.0), and Urinary tract infection, site not specified.    SOCIAL HISTORY  Social History     Tobacco Use   • Smoking status: Never Smoker   Substance and Sexual Activity   • Alcohol use: No   • Drug use: No   • Sexual activity: Yes     Partners: Male       Family History  No bleeding disorders     SURGICAL HISTORY   has a past surgical history that includes primary c section.    CURRENT MEDICATIONS  Home Medications    **Home medications have not yet been reviewed for this encounter**         ALLERGIES  Allergies   Allergen Reactions   • Aspirin Anaphylaxis   • Ciprofloxacin Hydrochloride Vomiting   • Contrast Media With Iodine [Iodine]    • Hydrocodone        REVIEW OF SYSTEMS  See HPI for further details. Review of systems as above, otherwise all other systems are negative.     PHYSICAL EXAM  Constitutional: Well developed, well nourished. No acute distress.  HEENT: Normocephalic, atraumatic. Posterior pharynx clear and moist.  Eyes:  EOMI. Normal sclera.  Neck: Supple, Full range of motion, nontender.  Chest/Pulmonary: clear to ausculation. Symmetrical expansion.   Cardio: Regular rate and  rhythm with no murmur.   Abdomen: Soft, nontender. No peritoneal signs. No guarding. No palpable masses.  Musculoskeletal: No deformity, no edema, neurovascular intact.   Neuro: Clear speech, appropriate, cooperative, cranial nerves II-XII grossly intact.  Psych: Normal mood and affect      Results for orders placed or performed during the hospital encounter of 01/28/21   CBC WITH DIFFERENTIAL   Result Value Ref Range    WBC 12.5 (H) 4.8 - 10.8 K/uL    RBC 4.54 4.20 - 5.40 M/uL    Hemoglobin 12.9 12.0 - 16.0 g/dL    Hematocrit 38.7 37.0 - 47.0 %    MCV 85.2 81.4 - 97.8 fL    MCH 28.4 27.0 - 33.0 pg    MCHC 33.3 (L) 33.6 - 35.0 g/dL    RDW 43.8 35.9 - 50.0 fL    Platelet Count 402 164 - 446 K/uL    MPV 9.0 9.0 - 12.9 fL    Neutrophils-Polys 77.10 (H) 44.00 - 72.00 %    Lymphocytes 15.10 (L) 22.00 - 41.00 %    Monocytes 6.80 0.00 - 13.40 %    Eosinophils 0.20 0.00 - 6.90 %    Basophils 0.20 0.00 - 1.80 %    Immature Granulocytes 0.60 0.00 - 0.90 %    Nucleated RBC 0.00 /100 WBC    Neutrophils (Absolute) 9.63 (H) 2.00 - 7.15 K/uL    Lymphs (Absolute) 1.88 1.00 - 4.80 K/uL    Monos (Absolute) 0.85 0.00 - 0.85 K/uL    Eos (Absolute) 0.02 0.00 - 0.51 K/uL    Baso (Absolute) 0.03 0.00 - 0.12 K/uL    Immature Granulocytes (abs) 0.08 0.00 - 0.11 K/uL    NRBC (Absolute) 0.00 K/uL   COMP METABOLIC PANEL   Result Value Ref Range    Sodium 133 (L) 135 - 145 mmol/L    Potassium 3.1 (L) 3.6 - 5.5 mmol/L    Chloride 102 96 - 112 mmol/L    Co2 21 20 - 33 mmol/L    Anion Gap 10.0 7.0 - 16.0    Glucose 113 (H) 65 - 99 mg/dL    Bun 6 (L) 8 - 22 mg/dL    Creatinine 0.29 (L) 0.50 - 1.40 mg/dL    Calcium 8.5 8.5 - 10.5 mg/dL    AST(SGOT) 20 12 - 45 U/L    ALT(SGPT) 14 2 - 50 U/L    Alkaline Phosphatase 100 (H) 30 - 99 U/L    Total Bilirubin 0.6 0.1 - 1.5 mg/dL    Albumin 2.7 (L) 3.2 - 4.9 g/dL    Total Protein 6.7 6.0 - 8.2 g/dL    Globulin 4.0 (H) 1.9 - 3.5 g/dL    A-G Ratio 0.7 g/dL   ESTIMATED GFR   Result Value Ref Range    GFR If  African American >60 >60 mL/min/1.73 m 2    GFR If Non African American >60 >60 mL/min/1.73 m 2   SARS-COV Antigen LELIA: Collect dry nasal swab AND NP swab in VTM   Result Value Ref Range    SARS-CoV-2 Source Nasal Swab    POC URINE PREGNANCY   Result Value Ref Range    POC Urine Pregnancy Test Negative Negative   POC UA   Result Value Ref Range    POC Color Yellow     POC Appearance Clear     POC Glucose Negative Negative mg/dL    POC Ketones Negative Negative mg/dL    POC Specific Gravity 1.010 1.005 - 1.030    POC Blood Negative Negative    POC Urine PH 6.0 5.0 - 8.0    POC Protein Negative Negative mg/dL    POC Nitrites Negative Negative    POC Leukocyte Esterase Small (A) Negative     CT-ABDOMEN-PELVIS W/O   Final Result         1.  2 mm calcification posterior to the bladder on the left with mild left urinary outflow tract obstructive changes, appearance suggests ureterovesicular junction stone   2.  Structure in the right lower quadrant with surgical clips, appearance suggests right lower quadrant abscess.   3.  Probable loculated fluid collection in the posterior pelvis, appearance concerning for abscess. Could be more definitively characterized with CT pelvis with contrast.   4.  Moderate size hiatal hernia   5.  Small to moderate pericardial effusion.   6.  Cholelithiasis   7.  Atherosclerosis and atherosclerotic coronary artery disease.      These findings were discussed with the patient's clinician, Chico Kennedy, on 1/28/2021 9:10 PM.            PROCEDURES     MEDICAL RECORD  I have reviewed patient's medical record and pertinent results are listed.    COURSE & MEDICAL DECISION MAKING  I have reviewed any medical record information, laboratory studies and radiographic results as noted above.    9:12 PM  I spoke to Dr. Back, who states the pt has an abscess and a left ureteral stone.     9:28 PM  I spoke to Dr. Stoll. He states hospital admit, possible IR drain, and he will consult.     9:39  PM  The pt will be admitted to the hospitalist.      HYDRATION: Based on the patient's presentation of Dehydration the patient was given IV fluids. IV Hydration was used because oral hydration was not adequate alone. Upon recheck following hydration, the patient was improved.      FINAL IMPRESSION  Abdominal abscess   Kidney stone       Electronically signed by: Chico Kennedy D.O., 1/28/2021 8:35 PM

## 2021-01-29 NOTE — PROGRESS NOTES
Report received, pt care assumed, tele box on. Pt aaox4, no signs of distress noted at this time. Patient resting comfortably in bed. POC discussed with pt and verbalizes no questions. Pt denies any additional needs at this time. Bed in lowest position, pt educated on fall risk and verbalized understanding, call light within reach, bed alarm plugged in and on.

## 2021-01-29 NOTE — PROGRESS NOTES
Report received from  NOC Rn. Assumed pt care. Pt is c/o frequent uncontrollable diarrhea, MD notified. Pt A&O x 4. Tele box on, fall precautions in place, call light and belongings within reach, bed in lowest position. Will continue to monitor.

## 2021-01-29 NOTE — ASSESSMENT & PLAN NOTE
Surgery: Dr. Stoll at [place] who performed original appendectomy ~2 weeks prior to admission  IR drain placed in RLQ abscess and pelvic fluid collection on 01/30  Blood culture: no growth to date  Abscess/fluid e.coli  Continue [place] CHEUNG, Dr. Stoll following, drain removed 2/3, clear for dc on 5 days of augmentin

## 2021-01-29 NOTE — PROGRESS NOTES
Pt has orders to receive 40 mEq of potassium PO along with two potassium IVPB. Pt tolorated the PO potassium and only one IVPB. Per Dr. Robles, its okay to cancel the second dose of IVPB potassium. Orders of CMP, INR, and CBC placed for this morning. Will recheck potassium and update MD with any changes.

## 2021-01-29 NOTE — H&P
Hospital Medicine History & Physical Note    Date of Service  1/29/2021    Primary Care Physician  Pilar Chapa P.A.-C.    Consultants  Surgery: Dr. Stoll at [facility] who performed original appendectomy ~2 weeks prior to admission  IR in AM    Code Status  Full Code    Chief Complaint  Chief Complaint   Patient presents with   • Abdominal Pain       History of Presenting Illness  75F s/p appendectomy earlier this month presents with RLQ abdominal pain that has been worsening associated with vomitless nausea, denies fevers. In the ED CTAP showed likely abscess associated with prior appendectomy and patient given zosyn. Patient also states she has felt short of breath with exertion and believes this is due to the pain in her abdomen. Patient has had 2 family members die of covid, the remaining family members have received their first shots. Patient also endorses some malaise, flank pain b/l R>L, and dysuria.    Upon admission, Covid+, add-on labs showing elevated Dimer 20, , leukocytosis, hypokalemia    Review of Systems  ROS  All systems reviewed and negative except as noted in HPI.    Past Medical History   has a past medical history of Anxiety, Blood transfusion, Chronic ear infection, Depression, Heart murmur, Hypertension (7/14/2011), Migraine, Renal stone (1/29/2021), Rheumatoid arthritis(714.0), and Urinary tract infection, site not specified.    Surgical History   has a past surgical history that includes primary c section.     Family History  family history includes Cancer in her maternal grandmother and sister; Diabetes in her maternal aunt, maternal grandfather, and maternal uncle; Heart Disease in her maternal aunt, maternal uncle, and mother.     Social History   reports that she has never smoked. She does not have any smokeless tobacco history on file. She reports that she does not drink alcohol or use drugs.    Allergies  Allergies   Allergen Reactions   • Aspirin Anaphylaxis   •  Ciprofloxacin Hydrochloride Vomiting   • Contrast Media With Iodine [Iodine]    • Hydrocodone      Pt states that she experienced hallucinations when taking this medication. Noted 01/28/21   • Toradol [Ketorolac Tromethamine]      Pt states that she experienced hallucinations when taking this medication. Noted 01/28/21       Medications  Prior to Admission Medications   Prescriptions Last Dose Informant Patient Reported? Taking?   GARLIC >2 days ago at unknown Patient Yes No   Sig: Take 1,000 mg by mouth every day.   alprazolam (XANAX) 0.5 MG TABS Not Taking at Unknown time Patient No No   Sig: Take 1 Tab by mouth 3 times a day as needed for Sleep.   Patient not taking: Reported on 1/28/2021   ascorbic acid (ASCORBIC ACID) 500 MG TABS >7 days ago at unknown Patient Yes No   Sig: Take 500 mg by mouth every day.   clotrimazole-betamethasone (LOTRISONE) 1-0.05 % CREA Not Taking at Unknown time Patient No No   Sig: Apply  to affected area(s) 2 times a day. Apply to area BID   Patient not taking: Reported on 1/28/2021   ergocalciferol (DRISDOL) 80332 UNIT capsule Not Taking at Unknown time Patient No No   Sig: Take  by mouth. Take by mouth twice per week.   Patient not taking: Reported on 1/28/2021   erythromycin 5 MG/GM Ointment >2 days ago at unknown Patient Yes No   Sig: Apply 1 Application to both eyes. Apply 2-3 times a week   fluoxetine (PROZAC) 10 MG CAPS 1/28/2021 at AM Patient No No   Sig: Take 1 Cap by mouth every day.   ibuprofen (MOTRIN) 800 MG Tab 1/28/2021 at AM Patient Yes Yes   Sig: Take 800 mg by mouth 2 (two) times a day.   multivitamin (THERAGRAN) Tab >2 days ago at unknown Patient Yes Yes   Sig: Take 1 Tab by mouth every day.   omeprazole (PRILOSEC) 40 MG capsule Not Taking at Unknown time Patient No No   Sig: Take 1 Cap by mouth every day.   Patient not taking: Reported on 1/28/2021      Facility-Administered Medications: None       Physical Exam  Temp:  [36.6 °C (97.8 °F)-36.9 °C (98.5 °F)] 36.8 °C  (98.2 °F)  Pulse:  [] 93  Resp:  [16-20] 18  BP: (118-165)/(68-99) 139/99  SpO2:  [91 %-97 %] 91 %    Physical Exam  Constitutional: Alert in no apparent distress.  HENT: No signs of trauma, Bilateral external ears normal, Nose normal.   Eyes: Pupils are equal and reactive, Conjunctiva normal, Non-icteric.   Neck: Normal range of motion, No tenderness, Supple, No stridor.   Cardiovascular: Regular rate and regular rhythm.   Thorax & Lungs: Normal breath sounds, No respiratory distress, No wheezing, No chest tenderness.   Abdomen: Bowel sounds normal, Soft, RLQ tenderness, could not palpate deeply in RLQ due to pain to check for masses, No obvious pulsatile masses. Positive peritoneal sign.  Skin: Warm, Dry, No erythema, No rash.    Back: No bony tenderness, Positive CVA tenderness.   Extremities: Intact distal pulses, No edema, No tenderness, No cyanosis. Cinebar neck deformities b/l with ulnar deviation of upper extremity digits.  Neurologic: Alert, No focal deficits noted.        Laboratory:  Recent Labs     01/28/21 2038   WBC 12.5*   RBC 4.54   HEMOGLOBIN 12.9   HEMATOCRIT 38.7   MCV 85.2   MCH 28.4   MCHC 33.3*   RDW 43.8   PLATELETCT 402   MPV 9.0     Recent Labs     01/28/21 2038   SODIUM 133*   POTASSIUM 3.1*   CHLORIDE 102   CO2 21   GLUCOSE 113*   BUN 6*   CREATININE 0.29*   CALCIUM 8.5     Recent Labs     01/28/21 2038   ALTSGPT 14   ASTSGOT 20   ALKPHOSPHAT 100*   TBILIRUBIN 0.6   GLUCOSE 113*         Recent Labs     01/28/21 2038   NTPROBNP 367*         No results for input(s): TROPONINT in the last 72 hours.    Imaging:  CT-ABDOMEN-PELVIS W/O   Final Result         1.  2 mm calcification posterior to the bladder on the left with mild left urinary outflow tract obstructive changes, appearance suggests ureterovesicular junction stone   2.  Structure in the right lower quadrant with surgical clips, appearance suggests right lower quadrant abscess.   3.  Probable loculated fluid collection in the  posterior pelvis, appearance concerning for abscess. Could be more definitively characterized with CT pelvis with contrast.   4.  Moderate size hiatal hernia   5.  Small to moderate pericardial effusion.   6.  Cholelithiasis   7.  Atherosclerosis and atherosclerotic coronary artery disease.      These findings were discussed with the patient's clinician, Chico Kennedy, on 1/28/2021 9:10 PM.      US-EXTREMITY VENOUS LOWER BILAT    (Results Pending)   US-EXTREMITY VENOUS UPPER BILAT    (Results Pending)   EC-ECHOCARDIOGRAM COMPLETE W/O CONT    (Results Pending)   US-RENAL    (Results Pending)         Assessment/Plan:  I anticipate this patient will require at least two midnights for appropriate medical management, necessitating inpatient admission.    COVID-19- (present on admission)  Assessment & Plan  Early mobilization, Proning and/or inspiratory spirometry if applicable  Follow Inflammatory markers, D-Dimer q48hrs, Ferritin, ESR, CRP  Procalcitonin  >Covid Treatment: If patient becomes hypoxic, requiring oxygen, start Decadron 6mg x 10 days,  >zinc sulfate, Ascorbic Acid, Incentive Spirometer, Oxygen support, Respiratory Therapy Consult.  >Symptomatic Treatment prn: Tessalon Perles, Robitussin, Zofran, Tylenol          Renal stone  Assessment & Plan    2 mm calcification posterior to the bladder on the left with mild left urinary outflow tract obstructive changes, appearance suggests ureterovesicular junction stone.  Renal Ultrasound  Consult Urology      Postprocedural intraabdominal abscess- (present on admission)  Assessment & Plan  Surgery: Dr. Stoll at [place] who performed original appendectomy ~2 weeks prior to admission  IR in AM for possible drainage  BCx  UA/UCx  Zosyn  Pain and Nausea symptom management      Hypokalemia- (present on admission)  Assessment & Plan  K-dur and IVPB given for total of 60mEq upon admission  Repeat bmp in AM  Supplement prn.    Rheumatoid arthritis (HCC)- (present on  admission)  Assessment & Plan  Patient declines medication for her Rheumatoid Arthritis other than ibuprofen, as patient experienced side effect of periodontal disease  Chronic, stable

## 2021-01-29 NOTE — ASSESSMENT & PLAN NOTE
Patient is on room air, no shortness of breath, no cough  No indication for decadron  Monitor closely

## 2021-01-29 NOTE — ED NOTES
Med rec complete via interview with pt at bedside. Allergies reviewed and updated. Pt denies antibiotic use in past 14 days.

## 2021-01-30 ENCOUNTER — APPOINTMENT (OUTPATIENT)
Dept: CARDIOLOGY | Facility: MEDICAL CENTER | Age: 76
DRG: 862 | End: 2021-01-30
Attending: STUDENT IN AN ORGANIZED HEALTH CARE EDUCATION/TRAINING PROGRAM
Payer: MEDICARE

## 2021-01-30 LAB
ALBUMIN SERPL BCP-MCNC: 2.6 G/DL (ref 3.2–4.9)
ALBUMIN/GLOB SERPL: 0.7 G/DL
ALP SERPL-CCNC: 80 U/L (ref 30–99)
ALT SERPL-CCNC: 13 U/L (ref 2–50)
ANION GAP SERPL CALC-SCNC: 11 MMOL/L (ref 7–16)
AST SERPL-CCNC: 16 U/L (ref 12–45)
BASOPHILS # BLD AUTO: 0.2 % (ref 0–1.8)
BASOPHILS # BLD: 0.02 K/UL (ref 0–0.12)
BILIRUB SERPL-MCNC: 0.5 MG/DL (ref 0.1–1.5)
BUN SERPL-MCNC: 7 MG/DL (ref 8–22)
CALCIUM SERPL-MCNC: 8.4 MG/DL (ref 8.5–10.5)
CHLORIDE SERPL-SCNC: 109 MMOL/L (ref 96–112)
CHOLEST SERPL-MCNC: 104 MG/DL (ref 100–199)
CO2 SERPL-SCNC: 21 MMOL/L (ref 20–33)
CREAT SERPL-MCNC: 0.34 MG/DL (ref 0.5–1.4)
E COLI SXT1+2 STL IA: NORMAL
EOSINOPHIL # BLD AUTO: 0.03 K/UL (ref 0–0.51)
EOSINOPHIL NFR BLD: 0.2 % (ref 0–6.9)
ERYTHROCYTE [DISTWIDTH] IN BLOOD BY AUTOMATED COUNT: 45.8 FL (ref 35.9–50)
GLOBULIN SER CALC-MCNC: 3.6 G/DL (ref 1.9–3.5)
GLUCOSE SERPL-MCNC: 108 MG/DL (ref 65–99)
GRAM STN SPEC: NORMAL
GRAM STN SPEC: NORMAL
HCT VFR BLD AUTO: 37.9 % (ref 37–47)
HDLC SERPL-MCNC: 26 MG/DL
HGB BLD-MCNC: 12.3 G/DL (ref 12–16)
IMM GRANULOCYTES # BLD AUTO: 0.05 K/UL (ref 0–0.11)
IMM GRANULOCYTES NFR BLD AUTO: 0.4 % (ref 0–0.9)
LDLC SERPL CALC-MCNC: 66 MG/DL
LV EJECT FRACT  99904: 65
LYMPHOCYTES # BLD AUTO: 1.47 K/UL (ref 1–4.8)
LYMPHOCYTES NFR BLD: 12 % (ref 22–41)
MAGNESIUM SERPL-MCNC: 2 MG/DL (ref 1.5–2.5)
MCH RBC QN AUTO: 28.1 PG (ref 27–33)
MCHC RBC AUTO-ENTMCNC: 32.5 G/DL (ref 33.6–35)
MCV RBC AUTO: 86.7 FL (ref 81.4–97.8)
MONOCYTES # BLD AUTO: 0.75 K/UL (ref 0–0.85)
MONOCYTES NFR BLD AUTO: 6.1 % (ref 0–13.4)
NEUTROPHILS # BLD AUTO: 9.97 K/UL (ref 2–7.15)
NEUTROPHILS NFR BLD: 81.1 % (ref 44–72)
NRBC # BLD AUTO: 0 K/UL
NRBC BLD-RTO: 0 /100 WBC
PHOSPHATE SERPL-MCNC: 3.2 MG/DL (ref 2.5–4.5)
PLATELET # BLD AUTO: 401 K/UL (ref 164–446)
PMV BLD AUTO: 9.8 FL (ref 9–12.9)
POTASSIUM SERPL-SCNC: 3.5 MMOL/L (ref 3.6–5.5)
PROT SERPL-MCNC: 6.2 G/DL (ref 6–8.2)
RBC # BLD AUTO: 4.37 M/UL (ref 4.2–5.4)
SIGNIFICANT IND 70042: NORMAL
SITE SITE: NORMAL
SODIUM SERPL-SCNC: 141 MMOL/L (ref 135–145)
SOURCE SOURCE: NORMAL
TRIGL SERPL-MCNC: 59 MG/DL (ref 0–149)
WBC # BLD AUTO: 12.3 K/UL (ref 4.8–10.8)

## 2021-01-30 PROCEDURE — 93321 DOPPLER ECHO F-UP/LMTD STD: CPT | Mod: 26 | Performed by: INTERNAL MEDICINE

## 2021-01-30 PROCEDURE — 93308 TTE F-UP OR LMTD: CPT | Mod: 26 | Performed by: INTERNAL MEDICINE

## 2021-01-30 PROCEDURE — 80061 LIPID PANEL: CPT

## 2021-01-30 PROCEDURE — 93325 DOPPLER ECHO COLOR FLOW MAPG: CPT

## 2021-01-30 PROCEDURE — 83735 ASSAY OF MAGNESIUM: CPT

## 2021-01-30 PROCEDURE — 93325 DOPPLER ECHO COLOR FLOW MAPG: CPT | Mod: 26 | Performed by: INTERNAL MEDICINE

## 2021-01-30 PROCEDURE — 700111 HCHG RX REV CODE 636 W/ 250 OVERRIDE (IP): Performed by: STUDENT IN AN ORGANIZED HEALTH CARE EDUCATION/TRAINING PROGRAM

## 2021-01-30 PROCEDURE — 770020 HCHG ROOM/CARE - TELE (206)

## 2021-01-30 PROCEDURE — 700102 HCHG RX REV CODE 250 W/ 637 OVERRIDE(OP): Performed by: STUDENT IN AN ORGANIZED HEALTH CARE EDUCATION/TRAINING PROGRAM

## 2021-01-30 PROCEDURE — A9270 NON-COVERED ITEM OR SERVICE: HCPCS | Performed by: INTERNAL MEDICINE

## 2021-01-30 PROCEDURE — 700102 HCHG RX REV CODE 250 W/ 637 OVERRIDE(OP): Performed by: INTERNAL MEDICINE

## 2021-01-30 PROCEDURE — 99233 SBSQ HOSP IP/OBS HIGH 50: CPT | Performed by: INTERNAL MEDICINE

## 2021-01-30 PROCEDURE — 84100 ASSAY OF PHOSPHORUS: CPT

## 2021-01-30 PROCEDURE — A9270 NON-COVERED ITEM OR SERVICE: HCPCS | Performed by: STUDENT IN AN ORGANIZED HEALTH CARE EDUCATION/TRAINING PROGRAM

## 2021-01-30 PROCEDURE — 85025 COMPLETE CBC W/AUTO DIFF WBC: CPT

## 2021-01-30 PROCEDURE — 36415 COLL VENOUS BLD VENIPUNCTURE: CPT

## 2021-01-30 PROCEDURE — 700105 HCHG RX REV CODE 258: Performed by: STUDENT IN AN ORGANIZED HEALTH CARE EDUCATION/TRAINING PROGRAM

## 2021-01-30 PROCEDURE — 80053 COMPREHEN METABOLIC PANEL: CPT

## 2021-01-30 RX ORDER — LOPERAMIDE HYDROCHLORIDE 2 MG/1
2 CAPSULE ORAL 4 TIMES DAILY PRN
Status: DISCONTINUED | OUTPATIENT
Start: 2021-01-30 | End: 2021-02-05 | Stop reason: HOSPADM

## 2021-01-30 RX ORDER — LABETALOL HYDROCHLORIDE 5 MG/ML
5 INJECTION, SOLUTION INTRAVENOUS ONCE
Status: DISCONTINUED | OUTPATIENT
Start: 2021-01-30 | End: 2021-01-30

## 2021-01-30 RX ORDER — ACETAMINOPHEN 500 MG
500 TABLET ORAL EVERY 6 HOURS PRN
Status: DISCONTINUED | OUTPATIENT
Start: 2021-01-30 | End: 2021-02-05 | Stop reason: HOSPADM

## 2021-01-30 RX ADMIN — ENOXAPARIN SODIUM 30 MG: 30 INJECTION SUBCUTANEOUS at 05:04

## 2021-01-30 RX ADMIN — ENOXAPARIN SODIUM 30 MG: 30 INJECTION SUBCUTANEOUS at 17:13

## 2021-01-30 RX ADMIN — PIPERACILLIN AND TAZOBACTAM 4.5 G: 4; .5 INJECTION, POWDER, LYOPHILIZED, FOR SOLUTION INTRAVENOUS; PARENTERAL at 21:11

## 2021-01-30 RX ADMIN — PIPERACILLIN AND TAZOBACTAM 4.5 G: 4; .5 INJECTION, POWDER, LYOPHILIZED, FOR SOLUTION INTRAVENOUS; PARENTERAL at 13:29

## 2021-01-30 RX ADMIN — ACETAMINOPHEN 500 MG: 500 TABLET ORAL at 17:56

## 2021-01-30 RX ADMIN — ACETAMINOPHEN 500 MG: 500 TABLET ORAL at 10:50

## 2021-01-30 RX ADMIN — PIPERACILLIN AND TAZOBACTAM 4.5 G: 4; .5 INJECTION, POWDER, LYOPHILIZED, FOR SOLUTION INTRAVENOUS; PARENTERAL at 05:04

## 2021-01-30 ASSESSMENT — PAIN DESCRIPTION - PAIN TYPE
TYPE: ACUTE PAIN
TYPE: SURGICAL PAIN
TYPE: ACUTE PAIN
TYPE: SURGICAL PAIN
TYPE: ACUTE PAIN

## 2021-01-30 ASSESSMENT — ENCOUNTER SYMPTOMS
COUGH: 0
CHILLS: 0
SPUTUM PRODUCTION: 0
DIARRHEA: 1
SHORTNESS OF BREATH: 0
VOMITING: 0
PALPITATIONS: 0
SORE THROAT: 0
DIZZINESS: 0
DOUBLE VISION: 0
BLURRED VISION: 0
MYALGIAS: 0
ABDOMINAL PAIN: 1
NAUSEA: 0
HEADACHES: 0
BLOOD IN STOOL: 0
FEVER: 0

## 2021-01-30 ASSESSMENT — FIBROSIS 4 INDEX: FIB4 SCORE: 0.96

## 2021-01-30 NOTE — CARE PLAN
Problem: Pain Management  Goal: Pain level will decrease to patient's comfort goal  Outcome: PROGRESSING AS EXPECTED     Problem: Infection  Goal: Will remain free from infection  Outcome: PROGRESSING AS EXPECTED     Problem: Knowledge Deficit  Goal: Knowledge of disease process/condition, treatment plan, diagnostic tests, and medications will improve  Outcome: PROGRESSING AS EXPECTED     Problem: Skin Integrity  Goal: Risk for impaired skin integrity will decrease  Outcome: PROGRESSING AS EXPECTED

## 2021-01-30 NOTE — PROGRESS NOTES
Report received from NOC Rn. Assumed pt care. Pt has a R pelvix drain, and a retroperitoneal pelvis drain, sites are CDI. Pt A&O x 4. Fall precautions in place, call light and belongings within reach, bed in lowest position. No signs of distress.

## 2021-01-30 NOTE — PROGRESS NOTES
Per patient's request, Fernanda called in ED admitting to come  patient's wallet to put in safe keeping. Upon  wallet from purse 2 other wallets where found. A total of 3 wallets, 4 credit cards, 12 insurance cards, 2 POA's, 2 RTC access cards, 17 business cards, 3 drivers licenses, and 1 gold earring will be stored in safe keeping. This RN and charge RN Delores verified wallet contents. Pt. Purse was charted in the person belonging log book on Tele 7 and stored in the personal belongings locked cupboard.

## 2021-01-30 NOTE — CARE PLAN
Problem: Pain Management  Goal: Pain level will decrease to patient's comfort goal  Outcome: PROGRESSING AS EXPECTED     Problem: Communication  Goal: The ability to communicate needs accurately and effectively will improve  Outcome: PROGRESSING AS EXPECTED     Problem: Safety  Goal: Will remain free from injury  Outcome: PROGRESSING AS EXPECTED     Problem: Infection  Goal: Will remain free from infection  Outcome: PROGRESSING AS EXPECTED

## 2021-01-30 NOTE — PROGRESS NOTES
Report received, pt care assumed, tele box on. Pt aaox4, no signs of distress noted at this time. Patient is back from IR. Pt has drains placed that are C/D/I. Patient resting comfortably in bed. POC discussed with pt and verbalizes no questions. Pt c/o of no pain. Pt denies any additional needs at this time. Bed in lowest position, pt educated on fall risk and verbalized understanding, call light within reach, bed alarm plugged in and on.

## 2021-01-30 NOTE — OR SURGEON
Immediate Post- Operative Note        PostOp Diagnosis: pelvic and RLQ abscesses      Procedure(s): RLQ and pelvic drains CT guided  Pus from both sites      Estimated Blood Loss: Less than 5 ml        Complications: None            1/29/2021     7:35 PM     Pawel Mcdonnell M.D.

## 2021-01-30 NOTE — PROGRESS NOTES
[facility] Medicine Daily Progress Note    Date of Service  1/30/2021    Chief Complaint  Abdominal pain    Hospital Course  75F s/p appendectomy earlier this month presents with RLQ abdominal pain that has been worsening associated with vomitless nausea, denies fevers. In the ED CTAP showed likely abscess associated with prior appendectomy and patient given zosyn    Interval Problem Update  Patient was seen and examined at bedside.  No acute events overnight.     BENJI: nonobstructing stone with mild hydro, follow up as outpatient   Right lower quadrant abscess and pelvic fluid collection s/p IR drains  Await cultures sensitivity and specificity  Continue [facility]  General surgery aware      Consultants/Specialty  IR  GS    Code Status  Full Code    Disposition  TBD    Review of Systems  Review of Systems   Constitutional: Negative for chills and fever.   HENT: Negative for congestion and sore throat.    Eyes: Negative for blurred vision and double vision.   Respiratory: Negative for cough, sputum production and shortness of breath.    Cardiovascular: Negative for chest pain and palpitations.   Gastrointestinal: Positive for abdominal pain and diarrhea. Negative for blood in stool, melena, nausea and vomiting.   Genitourinary: Negative for dysuria and frequency.   Musculoskeletal: Negative for myalgias.   Neurological: Negative for dizziness and headaches.        Physical Exam  Temp:  [36.2 °C (97.1 °F)-37.1 °C (98.8 °F)] 36.4 °C (97.6 °F)  Pulse:  [86-99] 94  Resp:  [14-19] 16  BP: (120-159)/() 136/97  SpO2:  [92 %-100 %] 92 %    Physical Exam  Constitutional:       General: She is not in acute distress.     Appearance: She is ill-appearing. She is not toxic-appearing.   HENT:      Head: Normocephalic.      Right Ear: External ear normal.      Left Ear: External ear normal.      Nose: No congestion.      Mouth/Throat:      Mouth: Mucous membranes are moist.      Pharynx: No oropharyngeal exudate.   Eyes:       Extraocular Movements: Extraocular movements intact.      Conjunctiva/sclera: Conjunctivae normal.      Pupils: Pupils are equal, round, and reactive to light.   Abdominal:      Palpations: Abdomen is soft.      Tenderness: There is abdominal tenderness. There is no guarding or rebound.   Musculoskeletal:         General: No swelling or deformity.   Skin:     Coloration: Skin is not jaundiced.      Findings: No bruising.   Neurological:      General: No focal deficit present.      Mental Status: She is oriented to person, place, and time.   Psychiatric:         Mood and Affect: Mood normal.         Behavior: Behavior normal.         Thought Content: Thought content normal.         Judgment: Judgment normal.         Fluids    Intake/Output Summary (Last 24 hours) at 1/30/2021 1045  Last data filed at 1/30/2021 1000  Gross per 24 hour   Intake 990 ml   Output 545 ml   Net 445 ml       Laboratory  Recent Labs     01/28/21 2038 01/29/21  0943   WBC 12.5* 13.0*   RBC 4.54 4.80   HEMOGLOBIN 12.9 13.5   HEMATOCRIT 38.7 40.9   MCV 85.2 85.2   MCH 28.4 28.1   MCHC 33.3* 33.0*   RDW 43.8 43.8   PLATELETCT 402 401   MPV 9.0 9.1     Recent Labs     01/28/21 2038 01/29/21  0943   SODIUM 133* 139   POTASSIUM 3.1* 3.4*   CHLORIDE 102 108   CO2 21 19*   GLUCOSE 113* 121*   BUN 6* 5*   CREATININE 0.29* 0.25*   CALCIUM 8.5 8.6     Recent Labs     01/29/21  0943   INR 1.07         Recent Labs     01/30/21  0655   TRIGLYCERIDE 59   HDL 26*   LDL 66       Imaging  EC-ECHOCARDIOGRAM LTD W/O CONT         CT-DRAIN-PERITONEAL   Final Result      1.  Successful CT-guided RIGHT upper quadrant peritoneal drainage tube placement.   2.  Successful CT-guided transgluteal peritoneal drainage tube placement.      Plan: Twice daily flushes with 10 mL of sterile saline. Monitor outputs. Please contact interventional radiology if there is any concern for tube dysfunction.         US-RENAL   Final Result      1.  Mild left hydronephrosis without  definite distal obstructive etiology identified.   2.  1.7 cm right renal cyst.      US-EXTREMITY VENOUS UPPER BILAT   Final Result      US-EXTREMITY VENOUS LOWER BILAT   Final Result      CT-ABDOMEN-PELVIS W/O   Final Result         1.  2 mm calcification posterior to the bladder on the left with mild left urinary outflow tract obstructive changes, appearance suggests ureterovesicular junction stone   2.  Structure in the right lower quadrant with surgical clips, appearance suggests right lower quadrant abscess.   3.  Probable loculated fluid collection in the posterior pelvis, appearance concerning for abscess. Could be more definitively characterized with CT pelvis with contrast.   4.  Moderate size hiatal hernia   5.  Small to moderate pericardial effusion.   6.  Cholelithiasis   7.  Atherosclerosis and atherosclerotic coronary artery disease.      These findings were discussed with the patient's clinician, Chico Kennedy, on 1/28/2021 9:10 PM.           Assessment/Plan  * Postprocedural intraabdominal abscess- (present on admission)  Assessment & Plan  Surgery: Dr. Stoll at [place] who performed original appendectomy ~2 weeks prior to admission  IR drain placed in RLQ abscess and pelvic fluid collection on 01/30  Blood culture: no growth to date  Abscess/fluid culture pending: await sensitivity and specificity  Anna CHEUNG, Dr. Stoll, aware      Renal stone  Assessment & Plan    2 mm calcification posterior to the bladder on the left with mild left urinary outflow tract obstructive changes, appearance suggests ureterovesicular junction stone.  Renal Ultrasound demonstrates mild hydro  Discussed with Dr. Brown, urology, ok to follow up as outpatient as long as not septic stone  Will await urinalysis results       Hypokalemia- (present on admission)  Assessment & Plan  K-dur and IVPB given for total of 60mEq upon admission  Repeat bmp in AM  Supplement prn.    Rheumatoid arthritis (HCC)- (present on  admission)  Assessment & Plan  Chronic, stable  Not on DMARD therapy    COVID-19- (present on admission)  Assessment & Plan  Patient is on room air, no shortness of breath, no cough  Monitor closely       VTE prophylaxis: Lovenox subq

## 2021-01-31 LAB
ALBUMIN SERPL BCP-MCNC: 2.3 G/DL (ref 3.2–4.9)
ALBUMIN/GLOB SERPL: 0.7 G/DL
ALP SERPL-CCNC: 87 U/L (ref 30–99)
ALT SERPL-CCNC: <5 U/L (ref 2–50)
ANION GAP SERPL CALC-SCNC: 10 MMOL/L (ref 7–16)
APPEARANCE UR: ABNORMAL
AST SERPL-CCNC: 10 U/L (ref 12–45)
BACTERIA #/AREA URNS HPF: NEGATIVE /HPF
BACTERIA STL CULT: NORMAL
BASOPHILS # BLD AUTO: 0.3 % (ref 0–1.8)
BASOPHILS # BLD: 0.03 K/UL (ref 0–0.12)
BILIRUB SERPL-MCNC: 0.6 MG/DL (ref 0.1–1.5)
BILIRUB UR QL STRIP.AUTO: NEGATIVE
BUN SERPL-MCNC: 9 MG/DL (ref 8–22)
C JEJUNI+C COLI AG STL QL: NORMAL
CALCIUM SERPL-MCNC: 7.9 MG/DL (ref 8.5–10.5)
CHLORIDE SERPL-SCNC: 105 MMOL/L (ref 96–112)
CO2 SERPL-SCNC: 23 MMOL/L (ref 20–33)
COLOR UR: YELLOW
CREAT SERPL-MCNC: 0.29 MG/DL (ref 0.5–1.4)
E COLI SXT1+2 STL IA: NORMAL
EKG IMPRESSION: NORMAL
EOSINOPHIL # BLD AUTO: 0.05 K/UL (ref 0–0.51)
EOSINOPHIL NFR BLD: 0.4 % (ref 0–6.9)
EPI CELLS #/AREA URNS HPF: NEGATIVE /HPF
ERYTHROCYTE [DISTWIDTH] IN BLOOD BY AUTOMATED COUNT: 45.6 FL (ref 35.9–50)
GLOBULIN SER CALC-MCNC: 3.5 G/DL (ref 1.9–3.5)
GLUCOSE SERPL-MCNC: 100 MG/DL (ref 65–99)
GLUCOSE UR STRIP.AUTO-MCNC: NEGATIVE MG/DL
HCT VFR BLD AUTO: 37.6 % (ref 37–47)
HGB BLD-MCNC: 12.2 G/DL (ref 12–16)
HYALINE CASTS #/AREA URNS LPF: ABNORMAL /LPF
IMM GRANULOCYTES # BLD AUTO: 0.07 K/UL (ref 0–0.11)
IMM GRANULOCYTES NFR BLD AUTO: 0.6 % (ref 0–0.9)
KETONES UR STRIP.AUTO-MCNC: ABNORMAL MG/DL
LEUKOCYTE ESTERASE UR QL STRIP.AUTO: ABNORMAL
LYMPHOCYTES # BLD AUTO: 1.81 K/UL (ref 1–4.8)
LYMPHOCYTES NFR BLD: 16 % (ref 22–41)
MAGNESIUM SERPL-MCNC: 1.9 MG/DL (ref 1.5–2.5)
MCH RBC QN AUTO: 28.3 PG (ref 27–33)
MCHC RBC AUTO-ENTMCNC: 32.4 G/DL (ref 33.6–35)
MCV RBC AUTO: 87.2 FL (ref 81.4–97.8)
MICRO URNS: ABNORMAL
MONOCYTES # BLD AUTO: 0.57 K/UL (ref 0–0.85)
MONOCYTES NFR BLD AUTO: 5.1 % (ref 0–13.4)
NEUTROPHILS # BLD AUTO: 8.75 K/UL (ref 2–7.15)
NEUTROPHILS NFR BLD: 77.6 % (ref 44–72)
NITRITE UR QL STRIP.AUTO: NEGATIVE
NRBC # BLD AUTO: 0 K/UL
NRBC BLD-RTO: 0 /100 WBC
PH UR STRIP.AUTO: 6.5 [PH] (ref 5–8)
PHOSPHATE SERPL-MCNC: 2.9 MG/DL (ref 2.5–4.5)
PLATELET # BLD AUTO: 357 K/UL (ref 164–446)
PMV BLD AUTO: 9.4 FL (ref 9–12.9)
POTASSIUM SERPL-SCNC: 3.1 MMOL/L (ref 3.6–5.5)
PROT SERPL-MCNC: 5.8 G/DL (ref 6–8.2)
PROT UR QL STRIP: 30 MG/DL
RBC # BLD AUTO: 4.31 M/UL (ref 4.2–5.4)
RBC # URNS HPF: ABNORMAL /HPF
RBC UR QL AUTO: ABNORMAL
SIGNIFICANT IND 70042: NORMAL
SITE SITE: NORMAL
SODIUM SERPL-SCNC: 138 MMOL/L (ref 135–145)
SOURCE SOURCE: NORMAL
SP GR UR STRIP.AUTO: 1.03
UROBILINOGEN UR STRIP.AUTO-MCNC: 0.2 MG/DL
WBC # BLD AUTO: 11.3 K/UL (ref 4.8–10.8)
WBC #/AREA URNS HPF: ABNORMAL /HPF

## 2021-01-31 PROCEDURE — 80053 COMPREHEN METABOLIC PANEL: CPT

## 2021-01-31 PROCEDURE — 84100 ASSAY OF PHOSPHORUS: CPT

## 2021-01-31 PROCEDURE — 85025 COMPLETE CBC W/AUTO DIFF WBC: CPT

## 2021-01-31 PROCEDURE — 81001 URINALYSIS AUTO W/SCOPE: CPT

## 2021-01-31 PROCEDURE — 83735 ASSAY OF MAGNESIUM: CPT

## 2021-01-31 PROCEDURE — 93010 ELECTROCARDIOGRAM REPORT: CPT | Performed by: INTERNAL MEDICINE

## 2021-01-31 PROCEDURE — 99232 SBSQ HOSP IP/OBS MODERATE 35: CPT | Performed by: INTERNAL MEDICINE

## 2021-01-31 PROCEDURE — A9270 NON-COVERED ITEM OR SERVICE: HCPCS | Performed by: INTERNAL MEDICINE

## 2021-01-31 PROCEDURE — 93005 ELECTROCARDIOGRAM TRACING: CPT | Performed by: INTERNAL MEDICINE

## 2021-01-31 PROCEDURE — 700111 HCHG RX REV CODE 636 W/ 250 OVERRIDE (IP): Performed by: STUDENT IN AN ORGANIZED HEALTH CARE EDUCATION/TRAINING PROGRAM

## 2021-01-31 PROCEDURE — 36415 COLL VENOUS BLD VENIPUNCTURE: CPT

## 2021-01-31 PROCEDURE — 770020 HCHG ROOM/CARE - TELE (206)

## 2021-01-31 PROCEDURE — 700102 HCHG RX REV CODE 250 W/ 637 OVERRIDE(OP): Performed by: INTERNAL MEDICINE

## 2021-01-31 PROCEDURE — 700111 HCHG RX REV CODE 636 W/ 250 OVERRIDE (IP): Performed by: INTERNAL MEDICINE

## 2021-01-31 PROCEDURE — 700105 HCHG RX REV CODE 258: Performed by: INTERNAL MEDICINE

## 2021-01-31 PROCEDURE — 700105 HCHG RX REV CODE 258: Performed by: STUDENT IN AN ORGANIZED HEALTH CARE EDUCATION/TRAINING PROGRAM

## 2021-01-31 RX ORDER — POTASSIUM CHLORIDE 20 MEQ/1
40 TABLET, EXTENDED RELEASE ORAL ONCE
Status: COMPLETED | OUTPATIENT
Start: 2021-01-31 | End: 2021-01-31

## 2021-01-31 RX ADMIN — POTASSIUM CHLORIDE 40 MEQ: 1500 TABLET, EXTENDED RELEASE ORAL at 12:18

## 2021-01-31 RX ADMIN — ENOXAPARIN SODIUM 30 MG: 30 INJECTION SUBCUTANEOUS at 17:36

## 2021-01-31 RX ADMIN — ONDANSETRON 4 MG: 2 INJECTION INTRAMUSCULAR; INTRAVENOUS at 17:33

## 2021-01-31 RX ADMIN — CEFTRIAXONE SODIUM 1 G: 1 INJECTION, POWDER, FOR SOLUTION INTRAMUSCULAR; INTRAVENOUS at 16:02

## 2021-01-31 RX ADMIN — ACETAMINOPHEN 500 MG: 500 TABLET ORAL at 21:26

## 2021-01-31 RX ADMIN — ENOXAPARIN SODIUM 30 MG: 30 INJECTION SUBCUTANEOUS at 05:06

## 2021-01-31 RX ADMIN — PIPERACILLIN AND TAZOBACTAM 4.5 G: 4; .5 INJECTION, POWDER, LYOPHILIZED, FOR SOLUTION INTRAVENOUS; PARENTERAL at 12:19

## 2021-01-31 RX ADMIN — LOPERAMIDE HYDROCHLORIDE 2 MG: 2 CAPSULE ORAL at 07:58

## 2021-01-31 RX ADMIN — ACETAMINOPHEN 500 MG: 500 TABLET ORAL at 07:58

## 2021-01-31 RX ADMIN — ACETAMINOPHEN 500 MG: 500 TABLET ORAL at 00:27

## 2021-01-31 RX ADMIN — PIPERACILLIN AND TAZOBACTAM 4.5 G: 4; .5 INJECTION, POWDER, LYOPHILIZED, FOR SOLUTION INTRAVENOUS; PARENTERAL at 05:06

## 2021-01-31 ASSESSMENT — ENCOUNTER SYMPTOMS
HEADACHES: 0
FEVER: 0
DIZZINESS: 0
SPUTUM PRODUCTION: 0
SORE THROAT: 0
COUGH: 0
PALPITATIONS: 0
BLURRED VISION: 0
SHORTNESS OF BREATH: 0
CHILLS: 0
MYALGIAS: 0
NAUSEA: 0
ABDOMINAL PAIN: 1
VOMITING: 0
DOUBLE VISION: 0
DIARRHEA: 1
BLOOD IN STOOL: 0

## 2021-01-31 ASSESSMENT — PAIN DESCRIPTION - PAIN TYPE
TYPE: ACUTE PAIN
TYPE: SURGICAL PAIN
TYPE: ACUTE PAIN
TYPE: SURGICAL PAIN
TYPE: ACUTE PAIN
TYPE: ACUTE PAIN

## 2021-01-31 ASSESSMENT — FIBROSIS 4 INDEX: FIB4 SCORE: 0.98

## 2021-01-31 NOTE — PROGRESS NOTES
"Surgery    Events  1/31/21: feeling a little better today, no pain at rest, no N/V, tolerating liquids, gets up to bathroom    Vitals  /97   Pulse 87   Temp 36.4 °C (97.5 °F) (Temporal)   Resp 16   Ht 1.651 m (5' 5\")   Wt 75.8 kg (167 lb 1.7 oz)   SpO2 97%   Breastfeeding No   BMI 27.81 kg/m²     Exam  Alert, no distress  Abdomen soft, nondistended, mild TTP around the drain site in RLQ, no R/G  Drains serosanguinous today    Drain output 15cc total in past 12    Labs  WBC 13 -> 12 -> 11 today  Hgb stable around 12  Creatinine normal    A/P)  1/13/21: lap appy at [facility]  1/29/21: IR drains placed for RLQ and pelvic abscesses    Improving  Advanced to regular diet  Up as tolerated  Continue antibiotics  Possibly remove drains tomorrow  Home on PO ABx likely 2-3 more days depending on clinical progress    Appreciate hospitalist support  Following along    Ed Stoll MD  [facility] (General and Vascular Surgery)  Cell: [phone] (text or call is fine, if you don't reach me please try my office)  Office: [phone]  __________________________________________________________________  Patient:Rahul Blanco   MRN:[id number]   CSN:[id number]    "

## 2021-01-31 NOTE — PROGRESS NOTES
[facility] Medicine Daily Progress Note    Date of Service  1/31/2021    Chief Complaint  Abdominal pain    Hospital Course  75F s/p appendectomy earlier this month presents with RLQ abdominal pain that has been worsening associated with vomitless nausea, denies fevers. In the ED CTAP showed likely abscess associated with prior appendectomy and patient given zosyn    Interval Problem Update  Patient was seen and examined at bedside.  No acute events overnight. Patient is resting comfortably in bed and in no acute distress.     Right lower quadrant abscess and pelvic fluid collection s/p IR drains 01/29  Await cultures sensitivity and specificity: E.coli,   Continue zosyn transition to orals in 1-2 days  UA negative  Follow up with urology as outpatient    Consultants/Specialty  IR  GS    Code Status  Full Code    Disposition  TBD    Review of Systems  Review of Systems   Constitutional: Negative for chills and fever.   HENT: Negative for congestion and sore throat.    Eyes: Negative for blurred vision and double vision.   Respiratory: Negative for cough, sputum production and shortness of breath.    Cardiovascular: Negative for chest pain and palpitations.   Gastrointestinal: Positive for abdominal pain and diarrhea. Negative for blood in stool, melena, nausea and vomiting.   Genitourinary: Negative for dysuria and frequency.   Musculoskeletal: Negative for myalgias.   Neurological: Negative for dizziness and headaches.        Physical Exam  Temp:  [36.1 °C (97 °F)-36.9 °C (98.5 °F)] 36.3 °C (97.3 °F)  Pulse:  [77-97] 90  Resp:  [16-18] 16  BP: (143-151)/(85-97) 151/85  SpO2:  [94 %-97 %] 95 %    Physical Exam  Constitutional:       General: She is not in acute distress.     Appearance: She is ill-appearing. She is not toxic-appearing.   HENT:      Head: Normocephalic.      Right Ear: External ear normal.      Left Ear: External ear normal.      Nose: No congestion.      Mouth/Throat:      Mouth: Mucous membranes are  moist.      Pharynx: No oropharyngeal exudate.   Eyes:      Extraocular Movements: Extraocular movements intact.      Conjunctiva/sclera: Conjunctivae normal.      Pupils: Pupils are equal, round, and reactive to light.   Abdominal:      Palpations: Abdomen is soft.      Tenderness: There is abdominal tenderness. There is no guarding or rebound.   Musculoskeletal:         General: No swelling or deformity.   Skin:     Coloration: Skin is not jaundiced.      Findings: No bruising.   Neurological:      General: No focal deficit present.      Mental Status: She is oriented to person, place, and time.   Psychiatric:         Mood and Affect: Mood normal.         Behavior: Behavior normal.         Thought Content: Thought content normal.         Judgment: Judgment normal.         Fluids    Intake/Output Summary (Last 24 hours) at 1/31/2021 1215  Last data filed at 1/31/2021 0800  Gross per 24 hour   Intake 400 ml   Output 40 ml   Net 360 ml       Laboratory  Recent Labs     01/29/21  0943 01/30/21  0655 01/31/21  0738   WBC 13.0* 12.3* 11.3*   RBC 4.80 4.37 4.31   HEMOGLOBIN 13.5 12.3 12.2   HEMATOCRIT 40.9 37.9 37.6   MCV 85.2 86.7 87.2   MCH 28.1 28.1 28.3   MCHC 33.0* 32.5* 32.4*   RDW 43.8 45.8 45.6   PLATELETCT 401 401 357   MPV 9.1 9.8 9.4     Recent Labs     01/29/21  0943 01/30/21  0655 01/31/21  0738   SODIUM 139 141 138   POTASSIUM 3.4* 3.5* 3.1*   CHLORIDE 108 109 105   CO2 19* 21 23   GLUCOSE 121* 108* 100*   BUN 5* 7* 9   CREATININE 0.25* 0.34* 0.29*   CALCIUM 8.6 8.4* 7.9*     Recent Labs     01/29/21  0943   INR 1.07         Recent Labs     01/30/21  0655   TRIGLYCERIDE 59   HDL 26*   LDL 66       Imaging  EC-ECHOCARDIOGRAM LTD W/O CONT   Final Result      CT-DRAIN-PERITONEAL   Final Result      1.  Successful CT-guided RIGHT upper quadrant peritoneal drainage tube placement.   2.  Successful CT-guided transgluteal peritoneal drainage tube placement.      Plan: Twice daily flushes with 10 mL of sterile  saline. Monitor outputs. Please contact interventional radiology if there is any concern for tube dysfunction.         US-RENAL   Final Result      1.  Mild left hydronephrosis without definite distal obstructive etiology identified.   2.  1.7 cm right renal cyst.      US-EXTREMITY VENOUS UPPER BILAT   Final Result      US-EXTREMITY VENOUS LOWER BILAT   Final Result      CT-ABDOMEN-PELVIS W/O   Final Result         1.  2 mm calcification posterior to the bladder on the left with mild left urinary outflow tract obstructive changes, appearance suggests ureterovesicular junction stone   2.  Structure in the right lower quadrant with surgical clips, appearance suggests right lower quadrant abscess.   3.  Probable loculated fluid collection in the posterior pelvis, appearance concerning for abscess. Could be more definitively characterized with CT pelvis with contrast.   4.  Moderate size hiatal hernia   5.  Small to moderate pericardial effusion.   6.  Cholelithiasis   7.  Atherosclerosis and atherosclerotic coronary artery disease.      These findings were discussed with the patient's clinician, Chico Kennedy, on 1/28/2021 9:10 PM.           Assessment/Plan  * Postprocedural intraabdominal abscess- (present on admission)  Assessment & Plan  Surgery: Dr. Stoll at [place] who performed original appendectomy ~2 weeks prior to admission  IR drain placed in RLQ abscess and pelvic fluid collection on 01/30  Blood culture: no growth to date  Abscess/fluid e.coli  Zosyn - transition to orals in 1-2 days  GS, Dr. Stoll, aware      Renal stone  Assessment & Plan    2 mm calcification posterior to the bladder on the left with mild left urinary outflow tract obstructive changes, appearance suggests ureterovesicular junction stone.  Renal Ultrasound demonstrates mild hydro  Discussed with Dr. Brown, urology, ok to follow up as outpatient as long as not septic stone  urinalysis pending      Hypokalemia- (present on  admission)  Assessment & Plan  K-dur and IVPB given for total of 60mEq upon admission  Repeat bmp in AM  Supplement prn.    Rheumatoid arthritis (HCC)- (present on admission)  Assessment & Plan  Chronic, stable  Not on DMARD therapy    COVID-19- (present on admission)  Assessment & Plan  Patient is on room air, no shortness of breath, no cough  Monitor closely       VTE prophylaxis: Lovenox subq

## 2021-01-31 NOTE — PROGRESS NOTES
Report received at bedside, pt care assumed, tele box on. Pt aaox4, no signs of distress noted at this time. Patient resting comfortably in bed. POC discussed with pt and verbalizes no questions. Pt denies any additional needs at this time. Bed in lowest position, pt educated on fall risk and verbalized understanding, call light within reach, bed alarm plugged in and on. Pt has a R pelvic drain, and a retroperitoneal pelvis drain. Both are C/D/I. Will continue to monitor.

## 2021-01-31 NOTE — CARE PLAN
Problem: Pain Management  Goal: Pain level will decrease to patient's comfort goal  Outcome: PROGRESSING AS EXPECTED     Problem: Infection  Goal: Will remain free from infection  Outcome: PROGRESSING AS EXPECTED     Problem: Venous Thromboembolism (VTW)/Deep Vein Thrombosis (DVT) Prevention:  Goal: Patient will participate in Venous Thrombosis (VTE)/Deep Vein Thrombosis (DVT)Prevention Measures  Outcome: PROGRESSING AS EXPECTED     Problem: Knowledge Deficit  Goal: Knowledge of disease process/condition, treatment plan, diagnostic tests, and medications will improve  Outcome: PROGRESSING AS EXPECTED

## 2021-01-31 NOTE — CONSULTS
General Surgery Consult    Date of Service: 1/30/2021    Consulting Physician: Ed Stoll M.D. [facility]    -------------------------------------------------------------------------------------------------    Chief complaint: Abdominal pain    HPI: This is a 75 y.o. female with history of laparoscopic appendectomy performed on 1/3/2021 at [facility].  That procedure was uncomplicated and the appendix did not appear to be perforated.  Patient says she developed worsening pain and symptoms of illness over the ensuing 2 weeks and ultimately returned to the hospital for reevaluation.  CT scan showed right lower quadrant and pelvic abscesses.  Patient has since undergone IR drainage successfully.  She is feeling better.    She was found to be Covid positive, she does not know how she got it and she does not appear to have significant symptoms associated with it.      Past Medical History:   Diagnosis Date   • Anxiety    • Blood transfusion    • Chronic ear infection    • Depression    • Heart murmur     rheumatic fever   • Hypertension 7/14/2011   • Migraine    • Renal stone 1/29/2021   • Rheumatoid arthritis(714.0)    • Urinary tract infection, site not specified        Past Surgical History:   Procedure Laterality Date   • PRIMARY C SECTION         Current Facility-Administered Medications   Medication Dose Route Frequency Provider Last Rate Last Admin   • acetaminophen (TYLENOL) tablet 500 mg  500 mg Oral Q6HRS PRN Jose Washington D.O.   500 mg at 01/30/21 1050   • loperamide (IMODIUM) capsule 2 mg  2 mg Oral 4X/DAY PRN Jose Washington D.O.       • senna-docusate (PERICOLACE or SENOKOT S) 8.6-50 MG per tablet 2 Tab  2 Tab Oral BID Chance Robles M.D.   Stopped at 01/29/21 1800    And   • polyethylene glycol/lytes (MIRALAX) PACKET 1 Packet  1 Packet Oral QDAY PRN Chance Robles M.D.        And   • magnesium hydroxide (MILK OF MAGNESIA) suspension 30 mL  30 mL Oral QDAY PRN Chance Robles  M.D.        And   • bisacodyl (DULCOLAX) suppository 10 mg  10 mg Rectal QDAY PRN Chance Robles M.D.       • labetalol (NORMODYNE/TRANDATE) injection 10 mg  10 mg Intravenous Q4HRS PRN Chance Robles M.D.   10 mg at 01/29/21 2241   • ondansetron (ZOFRAN) syringe/vial injection 4 mg  4 mg Intravenous Q4HRS PRN Chance Robles M.D.   4 mg at 01/29/21 0920   • ondansetron (ZOFRAN ODT) dispertab 4 mg  4 mg Oral Q4HRS PRN Chance Robles M.D.       • benzonatate (TESSALON) capsule 100 mg  100 mg Oral TID PRN Chance Robles M.D.       • guaiFENesin ER (MUCINEX) tablet 600 mg  600 mg Oral Q12HRS Chance Robles M.D.   600 mg at 01/29/21 0524   • piperacillin-tazobactam (ZOSYN) 4.5 g in  mL IVPB  4.5 g Intravenous Q8HRS Chance Robles M.D. 25 mL/hr at 01/30/21 1329 4.5 g at 01/30/21 1329   • enoxaparin (LOVENOX) inj 30 mg  30 mg Subcutaneous Q12HRS Chance Robles M.D.   30 mg at 01/30/21 0504   • morphine (pf) 4 mg/mL injection 1 mg  1 mg Intravenous Q4HRS PRN Neeraj Contreras M.D.           Social History     Socioeconomic History   • Marital status:      Spouse name: Not on file   • Number of children: Not on file   • Years of education: Not on file   • Highest education level: Not on file   Occupational History   • Not on file   Social Needs   • Financial resource strain: Not on file   • Food insecurity     Worry: Not on file     Inability: Not on file   • Transportation needs     Medical: Not on file     Non-medical: Not on file   Tobacco Use   • Smoking status: Never Smoker   Substance and Sexual Activity   • Alcohol use: No   • Drug use: No   • Sexual activity: Yes     Partners: Male   Lifestyle   • Physical activity     Days per week: Not on file     Minutes per session: Not on file   • Stress: Not on file   Relationships   • Social connections     Talks on phone: Not on file     Gets together: Not on file     Attends [employer] service: Not on file     Active member of club or  "organization: Not on file     Attends meetings of clubs or organizations: Not on file     Relationship status: Not on file   • Intimate partner violence     Fear of current or ex partner: Not on file     Emotionally abused: Not on file     Physically abused: Not on file     Forced sexual activity: Not on file   Other Topics Concern   • Not on file   Social History Narrative   • Not on file       Family History   Problem Relation Age of Onset   • Heart Disease Mother    • Cancer Sister         Lupus   • Diabetes Maternal Aunt    • Heart Disease Maternal Aunt    • Diabetes Maternal Uncle    • Heart Disease Maternal Uncle    • Cancer Maternal Grandmother         Colon   • Diabetes Maternal Grandfather        Allergies:  Aspirin, Ciprofloxacin hydrochloride, Contrast media with iodine [iodine], Hydrocodone, and Toradol [ketorolac tromethamine]    Review of Systems:  Constitutional: Negative for fever, chills, weight loss,   HENT:   Negative for hearing loss or tinnitus    Eyes:    Negative for blurred vision, double vision, or loss of vision  Respiratory:  Negative for cough, hemoptysis, or wheezing    Cardiac:  Negative for chest pain or palpitations or orthopnea  Vascular:  Negative for claudication or rest pain   Gastrointestinal: As per HPI   Genitourinary: Negative for dysuria, frequency, or hematuria   Musculoskeletal: Negative for myalgias, back pain, or joint pain  Skin:   Negative for itching or rash  Neurological:  Negative for dizziness, headaches, or tremors     Negative for speech disturbance     Negative for extremity weakness or paresthesias  Endo/Heme:  Negative for easy bruising or bleeding  Psychiatric:  Negative for depression, suicidal ideas, or hallucinations    Physical Exam:  /89   Pulse 97   Temp 36.8 °C (98.3 °F) (Temporal)   Resp 18   Ht 1.651 m (5' 5\")   Wt 75.8 kg (167 lb 1.7 oz)   SpO2 94%     Constitutional: Alert, oriented, no acute distress  HEENT:  Normocephalic and " atraumatic, EOMI  Neck:   Supple, no JVD,   Cardiovascular: Regular rate and rhythm,   Pulmonary:  Good air entry bilaterally,   Abdominal:  Soft,  Non-distended     Mildly tender to palpation in the right lower quadrant     No rebound/guarding     Drains intact, purulent output  Musculoskeletal: No edema, no tenderness  Neurological:  CN II-XII grossly intact, no focal deficits  Skin:   Skin is warm and dry. No rash noted.  Psychiatric:  Normal mood and affect.    Labs:  Recent Labs     01/28/21 2038 01/29/21  0943 01/30/21  0655   WBC 12.5* 13.0* 12.3*   RBC 4.54 4.80 4.37   HEMOGLOBIN 12.9 13.5 12.3   HEMATOCRIT 38.7 40.9 37.9   MCV 85.2 85.2 86.7   MCH 28.4 28.1 28.1   MCHC 33.3* 33.0* 32.5*   RDW 43.8 43.8 45.8   PLATELETCT 402 401 401   MPV 9.0 9.1 9.8     Recent Labs     01/28/21 2038 01/29/21  0943   SODIUM 133* 139   POTASSIUM 3.1* 3.4*   CHLORIDE 102 108   CO2 21 19*   GLUCOSE 113* 121*   BUN 6* 5*   CREATININE 0.29* 0.25*   CALCIUM 8.5 8.6     Recent Labs     01/29/21  0943   INR 1.07     Recent Labs     01/28/21 2038 01/29/21  0943   ASTSGOT 20 18   ALTSGPT 14 12   TBILIRUBIN 0.6 0.9   ALKPHOSPHAT 100* 104*   GLOBULIN 4.0* 4.0*   INR  --  1.07       Radiology:  CT scan showed right lower quadrant and pelvic abscesses.  Patient has since undergone IR drainage successfully.    Assessment:  -Postoperative intra-abdominal abscesses    Patient presents with 2 abscesses after laparoscopic appendectomy.  I am not entirely certain how these abscesses formed, or whether this was residual contamination left over from the initial operation or whether the staple line of the appendiceal stump leaked.    Patient does appear to be getting better after IR drainage.  We will continue the drains and IV antibiotics until the drain output stops, then we can remove the drains and switch to oral antibiotics and hopefully send her home.    Appreciate hospitalist service's support managing Ms. Blanco  Following  along      Ed Stoll M.D.  [facility]  [phone]  Cell: [phone]

## 2021-01-31 NOTE — PROGRESS NOTES
Report received from NOC Rn. Assumed pt care. Pt is c/o RLQ pain, medicated per the MAR. R pelvic drain and retroperitoneal pelvis drain sites and CDI. Pt A&O x 4. Fall precautions in place, call light and belongings within reach, bed in lowest position. No signs of distress. Will continue to monitor.

## 2021-02-01 LAB
ALBUMIN SERPL BCP-MCNC: 2.4 G/DL (ref 3.2–4.9)
BACTERIA FLD AEROBE CULT: ABNORMAL
BASOPHILS # BLD AUTO: 0.3 % (ref 0–1.8)
BASOPHILS # BLD: 0.03 K/UL (ref 0–0.12)
BUN SERPL-MCNC: 8 MG/DL (ref 8–22)
CALCIUM SERPL-MCNC: 8.1 MG/DL (ref 8.5–10.5)
CHLORIDE SERPL-SCNC: 102 MMOL/L (ref 96–112)
CO2 SERPL-SCNC: 24 MMOL/L (ref 20–33)
CREAT SERPL-MCNC: 0.25 MG/DL (ref 0.5–1.4)
EOSINOPHIL # BLD AUTO: 0.09 K/UL (ref 0–0.51)
EOSINOPHIL NFR BLD: 0.9 % (ref 0–6.9)
ERYTHROCYTE [DISTWIDTH] IN BLOOD BY AUTOMATED COUNT: 44.6 FL (ref 35.9–50)
GLUCOSE SERPL-MCNC: 125 MG/DL (ref 65–99)
GRAM STN SPEC: ABNORMAL
GRAM STN SPEC: ABNORMAL
HCT VFR BLD AUTO: 39.3 % (ref 37–47)
HGB BLD-MCNC: 12.8 G/DL (ref 12–16)
IMM GRANULOCYTES # BLD AUTO: 0.06 K/UL (ref 0–0.11)
IMM GRANULOCYTES NFR BLD AUTO: 0.6 % (ref 0–0.9)
LYMPHOCYTES # BLD AUTO: 1.55 K/UL (ref 1–4.8)
LYMPHOCYTES NFR BLD: 15.4 % (ref 22–41)
MAGNESIUM SERPL-MCNC: 1.9 MG/DL (ref 1.5–2.5)
MCH RBC QN AUTO: 28.3 PG (ref 27–33)
MCHC RBC AUTO-ENTMCNC: 32.6 G/DL (ref 33.6–35)
MCV RBC AUTO: 86.9 FL (ref 81.4–97.8)
MONOCYTES # BLD AUTO: 0.47 K/UL (ref 0–0.85)
MONOCYTES NFR BLD AUTO: 4.7 % (ref 0–13.4)
NEUTROPHILS # BLD AUTO: 7.85 K/UL (ref 2–7.15)
NEUTROPHILS NFR BLD: 78.1 % (ref 44–72)
NRBC # BLD AUTO: 0 K/UL
NRBC BLD-RTO: 0 /100 WBC
PHOSPHATE SERPL-MCNC: 2.4 MG/DL (ref 2.5–4.5)
PLATELET # BLD AUTO: 344 K/UL (ref 164–446)
PMV BLD AUTO: 9.3 FL (ref 9–12.9)
POTASSIUM SERPL-SCNC: 3.1 MMOL/L (ref 3.6–5.5)
RBC # BLD AUTO: 4.52 M/UL (ref 4.2–5.4)
SIGNIFICANT IND 70042: ABNORMAL
SIGNIFICANT IND 70042: ABNORMAL
SITE SITE: ABNORMAL
SITE SITE: ABNORMAL
SODIUM SERPL-SCNC: 134 MMOL/L (ref 135–145)
SOURCE SOURCE: ABNORMAL
SOURCE SOURCE: ABNORMAL
WBC # BLD AUTO: 10.1 K/UL (ref 4.8–10.8)

## 2021-02-01 PROCEDURE — 700105 HCHG RX REV CODE 258: Performed by: INTERNAL MEDICINE

## 2021-02-01 PROCEDURE — 99232 SBSQ HOSP IP/OBS MODERATE 35: CPT | Performed by: INTERNAL MEDICINE

## 2021-02-01 PROCEDURE — 83735 ASSAY OF MAGNESIUM: CPT

## 2021-02-01 PROCEDURE — 80069 RENAL FUNCTION PANEL: CPT

## 2021-02-01 PROCEDURE — 700111 HCHG RX REV CODE 636 W/ 250 OVERRIDE (IP): Performed by: STUDENT IN AN ORGANIZED HEALTH CARE EDUCATION/TRAINING PROGRAM

## 2021-02-01 PROCEDURE — 85025 COMPLETE CBC W/AUTO DIFF WBC: CPT

## 2021-02-01 PROCEDURE — 700101 HCHG RX REV CODE 250: Performed by: INTERNAL MEDICINE

## 2021-02-01 PROCEDURE — 700111 HCHG RX REV CODE 636 W/ 250 OVERRIDE (IP): Performed by: INTERNAL MEDICINE

## 2021-02-01 PROCEDURE — A9270 NON-COVERED ITEM OR SERVICE: HCPCS | Performed by: INTERNAL MEDICINE

## 2021-02-01 PROCEDURE — 700102 HCHG RX REV CODE 250 W/ 637 OVERRIDE(OP): Performed by: INTERNAL MEDICINE

## 2021-02-01 PROCEDURE — 36415 COLL VENOUS BLD VENIPUNCTURE: CPT

## 2021-02-01 PROCEDURE — 770020 HCHG ROOM/CARE - TELE (206)

## 2021-02-01 RX ORDER — POTASSIUM CHLORIDE 20 MEQ/1
40 TABLET, EXTENDED RELEASE ORAL ONCE
Status: COMPLETED | OUTPATIENT
Start: 2021-02-01 | End: 2021-02-01

## 2021-02-01 RX ORDER — TAMSULOSIN HYDROCHLORIDE 0.4 MG/1
0.4 CAPSULE ORAL
Status: DISCONTINUED | OUTPATIENT
Start: 2021-02-01 | End: 2021-02-05 | Stop reason: HOSPADM

## 2021-02-01 RX ADMIN — POTASSIUM CHLORIDE 40 MEQ: 1500 TABLET, EXTENDED RELEASE ORAL at 16:34

## 2021-02-01 RX ADMIN — POTASSIUM PHOSPHATE, MONOBASIC AND POTASSIUM PHOSPHATE, DIBASIC 30 MMOL: 224; 236 INJECTION, SOLUTION, CONCENTRATE INTRAVENOUS at 16:33

## 2021-02-01 RX ADMIN — ACETAMINOPHEN 500 MG: 500 TABLET ORAL at 16:33

## 2021-02-01 RX ADMIN — CEFTRIAXONE SODIUM 1 G: 1 INJECTION, POWDER, FOR SOLUTION INTRAMUSCULAR; INTRAVENOUS at 12:52

## 2021-02-01 RX ADMIN — TAMSULOSIN HYDROCHLORIDE 0.4 MG: 0.4 CAPSULE ORAL at 19:30

## 2021-02-01 RX ADMIN — ACETAMINOPHEN 500 MG: 500 TABLET ORAL at 08:12

## 2021-02-01 RX ADMIN — ENOXAPARIN SODIUM 30 MG: 30 INJECTION SUBCUTANEOUS at 17:09

## 2021-02-01 RX ADMIN — ENOXAPARIN SODIUM 30 MG: 30 INJECTION SUBCUTANEOUS at 05:38

## 2021-02-01 ASSESSMENT — ENCOUNTER SYMPTOMS
BLOOD IN STOOL: 0
NAUSEA: 1
VOMITING: 0
SPUTUM PRODUCTION: 0
BLURRED VISION: 0
DIZZINESS: 0
MYALGIAS: 0
COUGH: 0
PALPITATIONS: 0
CHILLS: 0
HEADACHES: 0
FEVER: 0
DOUBLE VISION: 0
ABDOMINAL PAIN: 1
SHORTNESS OF BREATH: 0
DIARRHEA: 1
SORE THROAT: 0

## 2021-02-01 ASSESSMENT — PAIN DESCRIPTION - PAIN TYPE
TYPE: ACUTE PAIN
TYPE: SURGICAL PAIN
TYPE: ACUTE PAIN

## 2021-02-01 NOTE — PROGRESS NOTES
"Surgery    Events  1/31/21: feeling a little better today, no pain at rest, no N/V, tolerating liquids, gets up to bathroom  2/1/21: feeling a better today, no pain at rest, no N/V, tolerating PO, gets up to bathroom    Vitals  /92   Pulse 92   Temp 36.7 °C (98 °F) (Temporal)   Resp 18   Ht 1.651 m (5' 5\")   Wt 77.4 kg (170 lb 10.2 oz)   SpO2 96%   Breastfeeding No   BMI 28.40 kg/m²     Exam  Alert, no distress  Abdomen soft, nondistended, mild TTP around the drain site in RLQ, no R/G  Drains: turbid today    Drain output 7cc total in past 12    Labs  WBC 13 -> 12 -> 11 -> normal today  Hgb stable around 12  Creatinine normal    A/P)  1/13/21: [facility] app[facility] at [facility]  1/29/21: IR drains placed for RLQ and pelvic abscesses    Improving  Advanced to regular diet  Up as tolerated  Continue antibiotics  Possibly remove drains tomorrow  Home on PO ABx likely 2-3 more days depending on clinical progress    Appreciate hospitalist support  Following along    Ed Stoll MD  [facility] (General and Vascular Surgery)  Cell: [phone] (text or call is fine, if you don't reach me please try my office)  Office: [phone]  __________________________________________________________________  Patient:Rahul Blanco   MRN:[id number]   CSN:[id number]    "

## 2021-02-01 NOTE — PROGRESS NOTES
Assumed care this pm from day shift RN. Patient resting in bed with no signs of distress or labored breathing. Patient AxO 4, O2 @ RA, VSS. Call bell and personal items within reach, bed locked in low position. Bed exit alarm on. Hourly rounding in place. Tele box in place, monitor room notified.

## 2021-02-01 NOTE — PROGRESS NOTES
Notified Dr. Washington that pt is refusing IV antibiotics.    Pt's BM this morning was a dark coffee color, notified Dr. Washington. No new order at this time. Will continue to monitor.

## 2021-02-01 NOTE — CARE PLAN
Problem: Knowledge Deficit  Goal: Knowledge of disease process/condition, treatment plan, diagnostic tests, and medications will improve  Outcome: PROGRESSING AS EXPECTED  Goal: Knowledge of the prescribed therapeutic regimen will improve  Outcome: PROGRESSING AS EXPECTED     Problem: Skin Integrity  Goal: Risk for impaired skin integrity will decrease  2/1/2021 1558 by TAMMY [email or web address].  Outcome: PROGRESSING AS EXPECTED  2/1/2021 1558 by Brooke Leyva R.N.  Outcome: PROGRESSING AS EXPECTED

## 2021-02-01 NOTE — PROGRESS NOTES
Hospital Medicine Daily Progress Note    Date of Service  2/1/2021    Chief Complaint  Abdominal pain    Hospital Course  75F s/p appendectomy earlier this month presents with RLQ abdominal pain that has been worsening associated with vomitless nausea, denies fevers. In the ED CTAP showed likely abscess associated with prior appendectomy and patient given zosyn    Interval Problem Update  Patient was seen and examined at bedside.  No acute events overnight. Patient is resting comfortably in bed and in no acute distress.     Transitioned to oral antibiotics on 01/31: Rocephin day 2  Right lower quadrant abscess and pelvic fluid collection s/p IR drains 01/29  UA negative  Follow up with urology as outpatient  General surgery following: defer drain removal to their discretion     Consultants/Specialty  IR  GS    Code Status  Full Code    Disposition  Home when medically clear    Review of Systems  Review of Systems   Constitutional: Negative for chills and fever.   HENT: Negative for congestion and sore throat.    Eyes: Negative for blurred vision and double vision.   Respiratory: Negative for cough, sputum production and shortness of breath.    Cardiovascular: Negative for chest pain and palpitations.   Gastrointestinal: Positive for abdominal pain, diarrhea and nausea. Negative for blood in stool, melena and vomiting.   Genitourinary: Negative for dysuria and frequency.   Musculoskeletal: Negative for myalgias.   Neurological: Negative for dizziness and headaches.        Physical Exam  Temp:  [36.2 °C (97.1 °F)-36.7 °C (98 °F)] 36.2 °C (97.1 °F)  Pulse:  [88-96] 96  Resp:  [16-20] 18  BP: (136-158)/(86-99) 151/98  SpO2:  [90 %-97 %] 94 %    Physical Exam  Constitutional:       General: She is not in acute distress.     Appearance: She is ill-appearing. She is not toxic-appearing.   HENT:      Head: Normocephalic.      Right Ear: External ear normal.      Left Ear: External ear normal.      Nose: No congestion.       Mouth/Throat:      Mouth: Mucous membranes are moist.      Pharynx: No oropharyngeal exudate.   Eyes:      Extraocular Movements: Extraocular movements intact.      Conjunctiva/sclera: Conjunctivae normal.      Pupils: Pupils are equal, round, and reactive to light.   Abdominal:      Palpations: Abdomen is soft.      Tenderness: There is abdominal tenderness. There is no guarding or rebound.      Comments: Right lower quadrant drain in place   Musculoskeletal:         General: No swelling or deformity.   Skin:     Coloration: Skin is not jaundiced.      Findings: No bruising.   Neurological:      General: No focal deficit present.      Mental Status: She is oriented to person, place, and time.   Psychiatric:         Mood and Affect: Mood normal.         Behavior: Behavior normal.         Thought Content: Thought content normal.         Judgment: Judgment normal.         Fluids    Intake/Output Summary (Last 24 hours) at 2/1/2021 1514  Last data filed at 2/1/2021 1400  Gross per 24 hour   Intake 20 ml   Output 27 ml   Net -7 ml       Laboratory  Recent Labs     01/30/21  0655 01/31/21  0738 02/01/21  1013   WBC 12.3* 11.3* 10.1   RBC 4.37 4.31 4.52   HEMOGLOBIN 12.3 12.2 12.8   HEMATOCRIT 37.9 37.6 39.3   MCV 86.7 87.2 86.9   MCH 28.1 28.3 28.3   MCHC 32.5* 32.4* 32.6*   RDW 45.8 45.6 44.6   PLATELETCT 401 357 344   MPV 9.8 9.4 9.3     Recent Labs     01/30/21  0655 01/31/21  0738 02/01/21  1013   SODIUM 141 138 134*   POTASSIUM 3.5* 3.1* 3.1*   CHLORIDE 109 105 102   CO2 21 23 24   GLUCOSE 108* 100* 125*   BUN 7* 9 8   CREATININE 0.34* 0.29* 0.25*   CALCIUM 8.4* 7.9* 8.1*             Recent Labs     01/30/21  0655   TRIGLYCERIDE 59   HDL 26*   LDL 66       Imaging  EC-ECHOCARDIOGRAM LTD W/O CONT   Final Result      CT-DRAIN-PERITONEAL   Final Result      1.  Successful CT-guided RIGHT upper quadrant peritoneal drainage tube placement.   2.  Successful CT-guided transgluteal peritoneal drainage tube placement.       Plan: Twice daily flushes with 10 mL of sterile saline. Monitor outputs. Please contact interventional radiology if there is any concern for tube dysfunction.         US-RENAL   Final Result      1.  Mild left hydronephrosis without definite distal obstructive etiology identified.   2.  1.7 cm right renal cyst.      US-EXTREMITY VENOUS UPPER BILAT   Final Result      US-EXTREMITY VENOUS LOWER BILAT   Final Result      CT-ABDOMEN-PELVIS W/O   Final Result         1.  2 mm calcification posterior to the bladder on the left with mild left urinary outflow tract obstructive changes, appearance suggests ureterovesicular junction stone   2.  Structure in the right lower quadrant with surgical clips, appearance suggests right lower quadrant abscess.   3.  Probable loculated fluid collection in the posterior pelvis, appearance concerning for abscess. Could be more definitively characterized with CT pelvis with contrast.   4.  Moderate size hiatal hernia   5.  Small to moderate pericardial effusion.   6.  Cholelithiasis   7.  Atherosclerosis and atherosclerotic coronary artery disease.      These findings were discussed with the patient's clinician, Chico Kennedy, on 1/28/2021 9:10 PM.           Assessment/Plan  * Postprocedural intraabdominal abscess- (present on admission)  Assessment & Plan  Surgery: Dr. Stoll at [facility] who performed original appendectomy ~2 weeks prior to admission  IR drain placed in RLQ abscess and pelvic fluid collection on 01/30  Blood culture: no growth to date  Abscess/fluid e.coli  Rocephin day 2  GS, Dr. Stoll following, defer drain removal to their discretion       Renal stone  Assessment & Plan    2 mm calcification posterior to the bladder on the left with mild left urinary outflow tract obstructive changes, appearance suggests ureterovesicular junction stone.  Renal Ultrasound demonstrates mild hydro  Discussed with Dr. Brown, urology, ok to follow up as outpatient as long as not  septic stone  urinalysis pending      Hypokalemia- (present on admission)  Assessment & Plan  K-dur and IVPB given for total of 60mEq upon admission  Repeat bmp in AM  Supplement prn.    Rheumatoid arthritis (HCC)- (present on admission)  Assessment & Plan  Chronic, stable  Not on DMARD therapy    COVID-19- (present on admission)  Assessment & Plan  Patient is on room air, no shortness of breath, no cough  Monitor closely       VTE prophylaxis: Lovenox subq

## 2021-02-02 LAB
ALBUMIN SERPL BCP-MCNC: 2.4 G/DL (ref 3.2–4.9)
BASOPHILS # BLD AUTO: 0.2 % (ref 0–1.8)
BASOPHILS # BLD: 0.02 K/UL (ref 0–0.12)
BUN SERPL-MCNC: 4 MG/DL (ref 8–22)
CALCIUM SERPL-MCNC: 8.4 MG/DL (ref 8.5–10.5)
CHLORIDE SERPL-SCNC: 106 MMOL/L (ref 96–112)
CO2 SERPL-SCNC: 23 MMOL/L (ref 20–33)
CREAT SERPL-MCNC: 0.26 MG/DL (ref 0.5–1.4)
EOSINOPHIL # BLD AUTO: 0.07 K/UL (ref 0–0.51)
EOSINOPHIL NFR BLD: 0.8 % (ref 0–6.9)
ERYTHROCYTE [DISTWIDTH] IN BLOOD BY AUTOMATED COUNT: 44.5 FL (ref 35.9–50)
GLUCOSE SERPL-MCNC: 110 MG/DL (ref 65–99)
HCT VFR BLD AUTO: 39 % (ref 37–47)
HGB BLD-MCNC: 12.6 G/DL (ref 12–16)
IMM GRANULOCYTES # BLD AUTO: 0.07 K/UL (ref 0–0.11)
IMM GRANULOCYTES NFR BLD AUTO: 0.8 % (ref 0–0.9)
LYMPHOCYTES # BLD AUTO: 1.4 K/UL (ref 1–4.8)
LYMPHOCYTES NFR BLD: 16.8 % (ref 22–41)
MAGNESIUM SERPL-MCNC: 1.9 MG/DL (ref 1.5–2.5)
MCH RBC QN AUTO: 27.7 PG (ref 27–33)
MCHC RBC AUTO-ENTMCNC: 32.3 G/DL (ref 33.6–35)
MCV RBC AUTO: 85.7 FL (ref 81.4–97.8)
MONOCYTES # BLD AUTO: 0.42 K/UL (ref 0–0.85)
MONOCYTES NFR BLD AUTO: 5.1 % (ref 0–13.4)
NEUTROPHILS # BLD AUTO: 6.33 K/UL (ref 2–7.15)
NEUTROPHILS NFR BLD: 76.3 % (ref 44–72)
NRBC # BLD AUTO: 0 K/UL
NRBC BLD-RTO: 0 /100 WBC
PHOSPHATE SERPL-MCNC: 3.2 MG/DL (ref 2.5–4.5)
PLATELET # BLD AUTO: 316 K/UL (ref 164–446)
PMV BLD AUTO: 9 FL (ref 9–12.9)
POTASSIUM SERPL-SCNC: 4 MMOL/L (ref 3.6–5.5)
RBC # BLD AUTO: 4.55 M/UL (ref 4.2–5.4)
SODIUM SERPL-SCNC: 137 MMOL/L (ref 135–145)
WBC # BLD AUTO: 8.3 K/UL (ref 4.8–10.8)

## 2021-02-02 PROCEDURE — 700102 HCHG RX REV CODE 250 W/ 637 OVERRIDE(OP): Performed by: INTERNAL MEDICINE

## 2021-02-02 PROCEDURE — 700105 HCHG RX REV CODE 258: Performed by: INTERNAL MEDICINE

## 2021-02-02 PROCEDURE — 700111 HCHG RX REV CODE 636 W/ 250 OVERRIDE (IP): Performed by: INTERNAL MEDICINE

## 2021-02-02 PROCEDURE — 36415 COLL VENOUS BLD VENIPUNCTURE: CPT

## 2021-02-02 PROCEDURE — 700111 HCHG RX REV CODE 636 W/ 250 OVERRIDE (IP): Performed by: STUDENT IN AN ORGANIZED HEALTH CARE EDUCATION/TRAINING PROGRAM

## 2021-02-02 PROCEDURE — 770020 HCHG ROOM/CARE - TELE (206)

## 2021-02-02 PROCEDURE — 83735 ASSAY OF MAGNESIUM: CPT

## 2021-02-02 PROCEDURE — 85025 COMPLETE CBC W/AUTO DIFF WBC: CPT

## 2021-02-02 PROCEDURE — 80069 RENAL FUNCTION PANEL: CPT

## 2021-02-02 PROCEDURE — 99232 SBSQ HOSP IP/OBS MODERATE 35: CPT | Performed by: INTERNAL MEDICINE

## 2021-02-02 PROCEDURE — A9270 NON-COVERED ITEM OR SERVICE: HCPCS | Performed by: INTERNAL MEDICINE

## 2021-02-02 RX ADMIN — ACETAMINOPHEN 500 MG: 500 TABLET ORAL at 08:23

## 2021-02-02 RX ADMIN — ENOXAPARIN SODIUM 30 MG: 30 INJECTION SUBCUTANEOUS at 05:14

## 2021-02-02 RX ADMIN — ENOXAPARIN SODIUM 30 MG: 30 INJECTION SUBCUTANEOUS at 17:34

## 2021-02-02 RX ADMIN — CEFTRIAXONE SODIUM 1 G: 1 INJECTION, POWDER, FOR SOLUTION INTRAMUSCULAR; INTRAVENOUS at 05:14

## 2021-02-02 RX ADMIN — TAMSULOSIN HYDROCHLORIDE 0.4 MG: 0.4 CAPSULE ORAL at 18:30

## 2021-02-02 RX ADMIN — ACETAMINOPHEN 500 MG: 500 TABLET ORAL at 00:17

## 2021-02-02 RX ADMIN — ONDANSETRON 4 MG: 4 TABLET, ORALLY DISINTEGRATING ORAL at 21:33

## 2021-02-02 RX ADMIN — ACETAMINOPHEN 500 MG: 500 TABLET ORAL at 23:22

## 2021-02-02 RX ADMIN — ACETAMINOPHEN 500 MG: 500 TABLET ORAL at 15:59

## 2021-02-02 ASSESSMENT — ENCOUNTER SYMPTOMS
SORE THROAT: 0
HEADACHES: 0
DIARRHEA: 1
MYALGIAS: 0
COUGH: 0
NAUSEA: 1
VOMITING: 0
DIZZINESS: 0
SPUTUM PRODUCTION: 0
CHILLS: 0
PALPITATIONS: 0
BLOOD IN STOOL: 0
FEVER: 0
SHORTNESS OF BREATH: 0
BLURRED VISION: 0
ABDOMINAL PAIN: 1
DOUBLE VISION: 0

## 2021-02-02 ASSESSMENT — FIBROSIS 4 INDEX: FIB4 SCORE: 1.1

## 2021-02-02 ASSESSMENT — PAIN DESCRIPTION - PAIN TYPE: TYPE: ACUTE PAIN

## 2021-02-02 NOTE — PROGRESS NOTES
"Surgery    Events  1/31/21: feeling a little better today, no pain at rest, no N/V, tolerating liquids, gets up to bathroom  2/1/21: feeling a better today, no pain at rest, no N/V, tolerating PO, gets up to bathroom  2/2/21: feeling better today, no pain at rest, she says \"occsional stomach upset\", tolerating PO, gets up to bathroom    Vitals  /100   Pulse (!) 106   Temp 36.1 °C (97 °F) (Temporal)   Resp 18   Ht 1.651 m (5' 5\")   Wt 77.4 kg (170 lb 10.2 oz)   SpO2 92%   Breastfeeding No   BMI 28.40 kg/m²     Exam  Alert, no distress  Abdomen soft, nondistended, mild TTP around the drain site in RLQ, no R/G  Drains: no output in bulbs today    Drain output 0cc total in past 12    Labs  WBC 13 -> 12 -> 11 -> normal x2 days  Hgb stable around 12  Creatinine normal    A/P)  1/13/21: [facility] app[facility]  1/29/21: IR drains placed for RLQ and pelvic abscesses    Improving  Advanced to regular diet  Up as tolerated  Continue antibiotics  Possibly remove drains tomorrow  Home on ABx likely 2-3 more days depending on clinical progress    Appreciate hospitalist support  Following along    Ed Stoll MD  [facility] (General and Vascular Surgery)  Cell: [phone] (text or call is fine, if you don't reach me please try my office)  Office: [phone]  __________________________________________________________________  Patient:Rahul Blanco   MRN:[id number]   CSN:[id number]    "

## 2021-02-02 NOTE — CARE PLAN
Problem: Bowel/Gastric:  Goal: Normal bowel function is maintained or improved  Outcome: PROGRESSING SLOWER THAN EXPECTED  Note: Pt is having very very loose/soft stools very frequently. Pt states this is abnormal but happens when she is on atibiotics. Will continue to encourage pt to drink and eat. Will monitor lab values.      Problem: Mobility  Goal: Risk for activity intolerance will decrease  Outcome: PROGRESSING SLOWER THAN EXPECTED  Note: PT moves very slowly as she always complains of pain in her stomach. Additionally, pt has rheumatoid arthritis which makes it difficult for normal range of motion. Will continue to encourage pt to move around.

## 2021-02-02 NOTE — PROGRESS NOTES
Hospital Medicine Daily Progress Note    Date of Service  2/2/2021    Chief Complaint  Abdominal pain    Hospital Course  75F s/p appendectomy 1/13/21 presents with RLQ abdominal pain that has been worsening associated with vomitless nausea, denies fevers. In the ED CTAP showed likely abscess associated with prior appendectomy and patient started on IV abx. IR placed RLQ and pelvic drains on 1/29. General surgery was consulted. On initial CT scan patient was also found to have a 2 mm calcification poster to the bladder on the left with mild left hydronephrosis and hydroureter. Her kidney function is stable and she denies any pain, discussed with urology who recommended follow up outpatient.     Interval Problem Update  No acute events overnight. Vitals stable. Patient reports that she continues to have abdominal pain and loose stool. Surgery reports that they will possibly remove the drains tomorrow.     Consultants/Specialty  IR  GS    Code Status  Full Code    Disposition  Home when medically clear    Review of Systems  Review of Systems   Constitutional: Negative for chills and fever.   HENT: Negative for congestion and sore throat.    Eyes: Negative for blurred vision and double vision.   Respiratory: Negative for cough, sputum production and shortness of breath.    Cardiovascular: Negative for chest pain and palpitations.   Gastrointestinal: Positive for abdominal pain, diarrhea and nausea. Negative for blood in stool, melena and vomiting.   Genitourinary: Negative for dysuria and frequency.   Musculoskeletal: Negative for myalgias.   Neurological: Negative for dizziness and headaches.        Physical Exam  Temp:  [36.1 °C (97 °F)-36.7 °C (98 °F)] 36.3 °C (97.4 °F)  Pulse:  [] 99  Resp:  [18] 18  BP: (141-165)/() 156/99  SpO2:  [92 %-95 %] 92 %    Physical Exam  Constitutional:       General: She is not in acute distress.     Appearance: She is not toxic-appearing.   HENT:      Head: Normocephalic.  Supervisor called about pt not being able to get off of O2. Pt was given two treatments of albuterol nebulizers and incentive spirometer instructions from RT. Was instructed to call neda. Giselle TAVERA given report. Dr. Parker Castaneda updated.   Eyes:      Extraocular Movements: Extraocular movements intact.      Conjunctiva/sclera: Conjunctivae normal.   Cardiovascular:      Rate and Rhythm: Normal rate and regular rhythm.   Pulmonary:      Effort: Pulmonary effort is normal. No respiratory distress.      Breath sounds: Normal breath sounds.   Abdominal:      Palpations: Abdomen is soft.      Tenderness: There is abdominal tenderness. There is no guarding or rebound.      Comments: Right lower quadrant drain in place   Musculoskeletal: Normal range of motion.         General: No swelling.   Skin:     General: Skin is warm and dry.   Neurological:      General: No focal deficit present.      Mental Status: She is alert and oriented to person, place, and time.   Psychiatric:         Mood and Affect: Mood normal.         Behavior: Behavior normal.         Fluids    Intake/Output Summary (Last 24 hours) at 2/2/2021 1423  Last data filed at 2/1/2021 1850  Gross per 24 hour   Intake --   Output 31 ml   Net -31 ml       Laboratory  Recent Labs     01/31/21  0738 02/01/21  1013 02/02/21  0614   WBC 11.3* 10.1 8.3   RBC 4.31 4.52 4.55   HEMOGLOBIN 12.2 12.8 12.6   HEMATOCRIT 37.6 39.3 39.0   MCV 87.2 86.9 85.7   MCH 28.3 28.3 27.7   MCHC 32.4* 32.6* 32.3*   RDW 45.6 44.6 44.5   PLATELETCT 357 344 316   MPV 9.4 9.3 9.0     Recent Labs     01/31/21  0738 02/01/21  1013 02/02/21  0614   SODIUM 138 134* 137   POTASSIUM 3.1* 3.1* 4.0   CHLORIDE 105 102 106   CO2 23 24 23   GLUCOSE 100* 125* 110*   BUN 9 8 4*   CREATININE 0.29* 0.25* 0.26*   CALCIUM 7.9* 8.1* 8.4*                   Imaging  EC-ECHOCARDIOGRAM LTD W/O CONT   Final Result      CT-DRAIN-PERITONEAL   Final Result      1.  Successful CT-guided RIGHT upper quadrant peritoneal drainage tube placement.   2.  Successful CT-guided transgluteal peritoneal drainage tube placement.      Plan: Twice daily flushes with 10 mL of sterile saline. Monitor outputs. Please contact interventional radiology if there is any  concern for tube dysfunction.         US-RENAL   Final Result      1.  Mild left hydronephrosis without definite distal obstructive etiology identified.   2.  1.7 cm right renal cyst.      US-EXTREMITY VENOUS UPPER BILAT   Final Result      US-EXTREMITY VENOUS LOWER BILAT   Final Result      CT-ABDOMEN-PELVIS W/O   Final Result         1.  2 mm calcification posterior to the bladder on the left with mild left urinary outflow tract obstructive changes, appearance suggests ureterovesicular junction stone   2.  Structure in the right lower quadrant with surgical clips, appearance suggests right lower quadrant abscess.   3.  Probable loculated fluid collection in the posterior pelvis, appearance concerning for abscess. Could be more definitively characterized with CT pelvis with contrast.   4.  Moderate size hiatal hernia   5.  Small to moderate pericardial effusion.   6.  Cholelithiasis   7.  Atherosclerosis and atherosclerotic coronary artery disease.      These findings were discussed with the patient's clinician, Chico Kennedy, on 1/28/2021 9:10 PM.           Assessment/Plan  * Postprocedural intraabdominal abscess- (present on admission)  Assessment & Plan  Surgery: Dr. Stoll at [facility] who performed original appendectomy ~2 weeks prior to admission  IR drain placed in RLQ abscess and pelvic fluid collection on 01/30  Blood culture: no growth to date  Abscess/fluid e.coli  Continue [facility], Dr. Stoll following, possible drain removal tomorrow       Renal stone  Assessment & Plan    2 mm calcification posterior to the bladder on the left with mild left urinary outflow tract obstructive changes, appearance suggests ureterovesicular junction stone.  Renal Ultrasound demonstrates mild hydro  Discussed with Dr. Brown, urology, ok to follow up as outpatient as long as not septic stone    Hypokalemia- (present on admission)  Assessment & Plan  K-dur and IVPB given for total of 60mEq upon admission  Repeat  bmp in AM  Supplement prn.    Rheumatoid arthritis (HCC)- (present on admission)  Assessment & Plan  Chronic, stable  Not on DMARD therapy    COVID-19- (present on admission)  Assessment & Plan  Patient is on room air, no shortness of breath, no cough  No indication for decadron  Monitor closely       VTE prophylaxis: Lovenox subq

## 2021-02-03 LAB
BACTERIA BLD CULT: NORMAL
BACTERIA BLD CULT: NORMAL
SIGNIFICANT IND 70042: NORMAL
SIGNIFICANT IND 70042: NORMAL
SITE SITE: NORMAL
SITE SITE: NORMAL
SOURCE SOURCE: NORMAL
SOURCE SOURCE: NORMAL

## 2021-02-03 PROCEDURE — A9270 NON-COVERED ITEM OR SERVICE: HCPCS | Performed by: STUDENT IN AN ORGANIZED HEALTH CARE EDUCATION/TRAINING PROGRAM

## 2021-02-03 PROCEDURE — 700111 HCHG RX REV CODE 636 W/ 250 OVERRIDE (IP): Performed by: INTERNAL MEDICINE

## 2021-02-03 PROCEDURE — 700102 HCHG RX REV CODE 250 W/ 637 OVERRIDE(OP): Performed by: INTERNAL MEDICINE

## 2021-02-03 PROCEDURE — 99232 SBSQ HOSP IP/OBS MODERATE 35: CPT | Performed by: INTERNAL MEDICINE

## 2021-02-03 PROCEDURE — A9270 NON-COVERED ITEM OR SERVICE: HCPCS | Performed by: INTERNAL MEDICINE

## 2021-02-03 PROCEDURE — 700105 HCHG RX REV CODE 258: Performed by: INTERNAL MEDICINE

## 2021-02-03 PROCEDURE — 700102 HCHG RX REV CODE 250 W/ 637 OVERRIDE(OP): Performed by: STUDENT IN AN ORGANIZED HEALTH CARE EDUCATION/TRAINING PROGRAM

## 2021-02-03 PROCEDURE — 700111 HCHG RX REV CODE 636 W/ 250 OVERRIDE (IP): Performed by: STUDENT IN AN ORGANIZED HEALTH CARE EDUCATION/TRAINING PROGRAM

## 2021-02-03 PROCEDURE — 770020 HCHG ROOM/CARE - TELE (206)

## 2021-02-03 RX ADMIN — ENOXAPARIN SODIUM 30 MG: 30 INJECTION SUBCUTANEOUS at 05:14

## 2021-02-03 RX ADMIN — TAMSULOSIN HYDROCHLORIDE 0.4 MG: 0.4 CAPSULE ORAL at 17:03

## 2021-02-03 RX ADMIN — ENOXAPARIN SODIUM 30 MG: 30 INJECTION SUBCUTANEOUS at 17:02

## 2021-02-03 RX ADMIN — ACETAMINOPHEN 500 MG: 500 TABLET ORAL at 05:14

## 2021-02-03 RX ADMIN — ACETAMINOPHEN 500 MG: 500 TABLET ORAL at 17:03

## 2021-02-03 RX ADMIN — CEFTRIAXONE SODIUM 1 G: 1 INJECTION, POWDER, FOR SOLUTION INTRAMUSCULAR; INTRAVENOUS at 05:14

## 2021-02-03 RX ADMIN — GUAIFENESIN 600 MG: 600 TABLET, EXTENDED RELEASE ORAL at 17:03

## 2021-02-03 ASSESSMENT — PAIN DESCRIPTION - PAIN TYPE
TYPE: ACUTE PAIN

## 2021-02-03 ASSESSMENT — ENCOUNTER SYMPTOMS
SHORTNESS OF BREATH: 0
SORE THROAT: 0
BLURRED VISION: 0
COUGH: 0
VOMITING: 0
CHILLS: 0
NAUSEA: 1
BLOOD IN STOOL: 0
PALPITATIONS: 0
HEADACHES: 0
DOUBLE VISION: 0
MYALGIAS: 0
DIARRHEA: 1
DIZZINESS: 0
FEVER: 0
SPUTUM PRODUCTION: 0
ABDOMINAL PAIN: 1

## 2021-02-03 NOTE — PROGRESS NOTES
"Surgery    Events  1/31/21: feeling a little better today, no pain at rest, no N/V, tolerating liquids, gets up to bathroom  2/1/21: feeling a better today, no pain at rest, no N/V, tolerating PO, gets up to bathroom  2/2/21: feeling better today, no pain at rest, she says \"occsional stomach upset\", tolerating PO, gets up to bathroom   2/3/21:  Patient says she \"turned the corner today\" in a good way, tolerated oatmeal for breakfast, no nausea or vomiting, +BM    Vitals  /89   Pulse (!) 115   Temp 36.8 °C (98.2 °F) (Temporal)   Resp 16   Ht 1.651 m (5' 5\")   Wt 74.4 kg (164 lb 0.4 oz)   SpO2 97%   Breastfeeding No   BMI 27.29 kg/m²     Exam  Alert, no distress  Abdomen soft, nondistended, mild TTP around the drain site in RLQ, no R/G  Drains: minimal output    Labs  WBC 13 -> 12 -> 11 -> normal x2 days -> pending today  Hgb stable around 12  Creatinine normal    A/P)  1/13: lap appy at [facility]  1/29: IR drains placed for RLQ and pelvic abscesses  2/3: drains removed    Improving  Regular diet  Up as tolerated  Continue antibiotics  Home on ABx likely 1-2 more days depending on clinical progress    Appreciate hospitalist support  Following along    Ed Stoll MD  [facility] (General and Vascular Surgery)  Cell: [phone] (text or call is fine, if you don't reach me please try my office)  Office: [phone]  __________________________________________________________________  Patient:Rahul Blanco   MRN:[id number]   CSN:[id number]    "

## 2021-02-03 NOTE — PROGRESS NOTES
Received bedside report from RN, pt care assumed, VSS, pt assessment complete. Pt AAOx4, c/o 6/10 pain at this time. No signs of acute distress noted at this time. POC discussed with pt and verbalizes no questions. Pt denies any additional needs at this time. Bed in lowest position, pt educated on fall risk and verbalized understanding, call light within reach, hourly rounding initiated.

## 2021-02-03 NOTE — CARE PLAN
Problem: Pain Management  Goal: Pain level will decrease to patient's comfort goal  Outcome: PROGRESSING AS EXPECTED  Note: Patient appropriately verbalizing pain. PRN medications given with pain improvement.      Problem: Communication  Goal: The ability to communicate needs accurately and effectively will improve  Outcome: PROGRESSING AS EXPECTED     Problem: Safety  Goal: Will remain free from injury  Outcome: PROGRESSING AS EXPECTED  Goal: Will remain free from falls  Outcome: PROGRESSING AS EXPECTED  Note: Fall precautions in place. Patient using call light appropriately.

## 2021-02-03 NOTE — CARE PLAN
Problem: Pain Management  Goal: Pain level will decrease to patient's comfort goal  Outcome: PROGRESSING AS EXPECTED     Problem: Safety  Goal: Will remain free from injury  Outcome: PROGRESSING AS EXPECTED

## 2021-02-03 NOTE — CARE PLAN
Problem: Pain Management  Goal: Pain level will decrease to patient's comfort goal  Outcome: PROGRESSING AS EXPECTED     Problem: Communication  Goal: The ability to communicate needs accurately and effectively will improve  Outcome: PROGRESSING AS EXPECTED     Problem: Safety  Goal: Will remain free from injury  Outcome: PROGRESSING AS EXPECTED    Assumed care of patient at 0715. Received bedside report from night shift RN. Bed is locked and lowest position, call light within reach. Treaded socks in place. Patient updated on plan of care, no complaints or pain at this time. White board updated. Pt AxOx4 Patient breathing pattern is unlabored. Pt is medical. All needs met at this time. Will continue care and monitoring as ordered. Supplies in room for MD to remove RENAE drains.

## 2021-02-03 NOTE — PROGRESS NOTES
[facility] Medicine Daily Progress Note    Date of Service  2/3/2021    Chief Complaint  Abdominal pain    Hospital Course  75F s/p appendectomy 1/13/21 presents with RLQ abdominal pain that has been worsening associated with vomitless nausea, denies fevers. In the ED CTAP showed likely abscess associated with prior appendectomy and patient started on IV abx. IR placed RLQ and pelvic drains on 1/29. General surgery was consulted. On initial CT scan patient was also found to have a 2 mm calcification poster to the bladder on the left with mild left hydronephrosis and hydroureter. Her kidney function is stable and she denies any pain, discussed with urology who recommended follow up outpatient.     Interval Problem Update  Patient reports she is feeling much better after having her drains removed this morning. States she had a BM and tolerated breakfast. Pending surgical clearance for dc.  Called lab about urine culture from previous UA, it was not run, since it had WBC and LE will recheck a UA.     Consultants/Specialty  IR  GS    Code Status  Full Code    Disposition  Home when medically clear    Review of Systems  Review of Systems   Constitutional: Negative for chills and fever.   HENT: Negative for congestion and sore throat.    Eyes: Negative for blurred vision and double vision.   Respiratory: Negative for cough, sputum production and shortness of breath.    Cardiovascular: Negative for chest pain and palpitations.   Gastrointestinal: Positive for abdominal pain, diarrhea and nausea. Negative for blood in stool, melena and vomiting.   Genitourinary: Negative for dysuria and frequency.   Musculoskeletal: Negative for myalgias.   Neurological: Negative for dizziness and headaches.        Physical Exam  Temp:  [36.2 °C (97.1 °F)-36.9 °C (98.4 °F)] 36.4 °C (97.6 °F)  Pulse:  [] 102  Resp:  [16-20] 16  BP: (141-158)/() 141/91  SpO2:  [92 %-98 %] 93 %    Physical Exam  Constitutional:       General: She is  not in acute distress.     Appearance: She is not toxic-appearing.   HENT:      Head: Normocephalic.   Eyes:      Extraocular Movements: Extraocular movements intact.      Conjunctiva/sclera: Conjunctivae normal.   Cardiovascular:      Rate and Rhythm: Normal rate and regular rhythm.   Pulmonary:      Effort: Pulmonary effort is normal. No respiratory distress.      Breath sounds: Normal breath sounds.   Abdominal:      Palpations: Abdomen is soft.      Tenderness: There is abdominal tenderness (mild). There is no guarding or rebound.   Musculoskeletal: Normal range of motion.         General: No swelling.   Skin:     General: Skin is warm and dry.   Neurological:      General: No focal deficit present.      Mental Status: She is alert and oriented to person, place, and time.   Psychiatric:         Mood and Affect: Mood normal.         Behavior: Behavior normal.         Fluids    Intake/Output Summary (Last 24 hours) at 2/3/2021 1301  Last data filed at 2/3/2021 0715  Gross per 24 hour   Intake 50 ml   Output 980 ml   Net -930 ml       Laboratory  Recent Labs     02/01/21  1013 02/02/21  0614   WBC 10.1 8.3   RBC 4.52 4.55   HEMOGLOBIN 12.8 12.6   HEMATOCRIT 39.3 39.0   MCV 86.9 85.7   MCH 28.3 27.7   MCHC 32.6* 32.3*   RDW 44.6 44.5   PLATELETCT 344 316   MPV 9.3 9.0     Recent Labs     02/01/21  1013 02/02/21  0614   SODIUM 134* 137   POTASSIUM 3.1* 4.0   CHLORIDE 102 106   CO2 24 23   GLUCOSE 125* 110*   BUN 8 4*   CREATININE 0.25* 0.26*   CALCIUM 8.1* 8.4*                   Imaging  EC-ECHOCARDIOGRAM LTD W/O CONT   Final Result      CT-DRAIN-PERITONEAL   Final Result      1.  Successful CT-guided RIGHT upper quadrant peritoneal drainage tube placement.   2.  Successful CT-guided transgluteal peritoneal drainage tube placement.      Plan: Twice daily flushes with 10 mL of sterile saline. Monitor outputs. Please contact interventional radiology if there is any concern for tube dysfunction.         US-RENAL   Final  Result      1.  Mild left hydronephrosis without definite distal obstructive etiology identified.   2.  1.7 cm right renal cyst.      US-EXTREMITY VENOUS UPPER BILAT   Final Result      US-EXTREMITY VENOUS LOWER BILAT   Final Result      CT-ABDOMEN-PELVIS W/O   Final Result         1.  2 mm calcification posterior to the bladder on the left with mild left urinary outflow tract obstructive changes, appearance suggests ureterovesicular junction stone   2.  Structure in the right lower quadrant with surgical clips, appearance suggests right lower quadrant abscess.   3.  Probable loculated fluid collection in the posterior pelvis, appearance concerning for abscess. Could be more definitively characterized with CT pelvis with contrast.   4.  Moderate size hiatal hernia   5.  Small to moderate pericardial effusion.   6.  Cholelithiasis   7.  Atherosclerosis and atherosclerotic coronary artery disease.      These findings were discussed with the patient's clinician, Chico Kennedy, on 1/28/2021 9:10 PM.           Assessment/Plan  * Postprocedural intraabdominal abscess- (present on admission)  Assessment & Plan  Surgery: Dr. Stoll at [facility] who performed original appendectomy ~2 weeks prior to admission  IR drain placed in RLQ abscess and pelvic fluid collection on 01/30  Blood culture: no growth to date  Abscess/fluid e.coli  [facility] Dr. Stoll following, drain removed 2/3, pending final recs for dc      Renal stone  Assessment & Plan    2 mm calcification posterior to the bladder on the left with mild left urinary outflow tract obstructive changes, appearance suggests ureterovesicular junction stone.  Renal Ultrasound demonstrates mild hydro  Discussed with Dr. Brown, urology, ok to follow up as outpatient as long as not septic stone    Will recheck UA, previous + WBC and LE no culture ran     Hypokalemia- (present on admission)  Assessment & Plan  K-dur and IVPB given for total of 60mEq upon  admission  Repeat bmp in AM  Supplement prn.    Rheumatoid arthritis (HCC)- (present on admission)  Assessment & Plan  Chronic, stable  Not on DMARD therapy    COVID-19- (present on admission)  Assessment & Plan  Patient is on room air, no shortness of breath, no cough  No indication for decadron  Monitor closely       VTE prophylaxis: Lovenox subq    I have performed a physical exam and reviewed and updated ROS and Plan today (2/3/2021). In review of yesterday's note (2/2/2021), there are no changes except as documented above.

## 2021-02-04 LAB
ALBUMIN SERPL BCP-MCNC: 2.7 G/DL (ref 3.2–4.9)
APPEARANCE UR: ABNORMAL
BACTERIA #/AREA URNS HPF: NEGATIVE /HPF
BILIRUB UR QL STRIP.AUTO: NEGATIVE
BUN SERPL-MCNC: 6 MG/DL (ref 8–22)
CALCIUM SERPL-MCNC: 8.5 MG/DL (ref 8.5–10.5)
CHLORIDE SERPL-SCNC: 102 MMOL/L (ref 96–112)
CO2 SERPL-SCNC: 25 MMOL/L (ref 20–33)
COLOR UR: YELLOW
CREAT SERPL-MCNC: 0.29 MG/DL (ref 0.5–1.4)
EPI CELLS #/AREA URNS HPF: NEGATIVE /HPF
ERYTHROCYTE [DISTWIDTH] IN BLOOD BY AUTOMATED COUNT: 44.4 FL (ref 35.9–50)
GLUCOSE SERPL-MCNC: 110 MG/DL (ref 65–99)
GLUCOSE UR STRIP.AUTO-MCNC: NEGATIVE MG/DL
HCT VFR BLD AUTO: 43.1 % (ref 37–47)
HGB BLD-MCNC: 13.8 G/DL (ref 12–16)
HYALINE CASTS #/AREA URNS LPF: ABNORMAL /LPF
KETONES UR STRIP.AUTO-MCNC: NEGATIVE MG/DL
LEUKOCYTE ESTERASE UR QL STRIP.AUTO: ABNORMAL
MCH RBC QN AUTO: 27.7 PG (ref 27–33)
MCHC RBC AUTO-ENTMCNC: 32 G/DL (ref 33.6–35)
MCV RBC AUTO: 86.5 FL (ref 81.4–97.8)
MICRO URNS: ABNORMAL
NITRITE UR QL STRIP.AUTO: NEGATIVE
PH UR STRIP.AUTO: 7.5 [PH] (ref 5–8)
PHOSPHATE SERPL-MCNC: 3.1 MG/DL (ref 2.5–4.5)
PLATELET # BLD AUTO: 304 K/UL (ref 164–446)
PMV BLD AUTO: 9.4 FL (ref 9–12.9)
POTASSIUM SERPL-SCNC: 3.8 MMOL/L (ref 3.6–5.5)
PROT UR QL STRIP: NEGATIVE MG/DL
RBC # BLD AUTO: 4.98 M/UL (ref 4.2–5.4)
RBC # URNS HPF: ABNORMAL /HPF
RBC UR QL AUTO: NEGATIVE
SODIUM SERPL-SCNC: 134 MMOL/L (ref 135–145)
SP GR UR STRIP.AUTO: 1.02
UROBILINOGEN UR STRIP.AUTO-MCNC: 0.2 MG/DL
WBC # BLD AUTO: 6.1 K/UL (ref 4.8–10.8)
WBC #/AREA URNS HPF: ABNORMAL /HPF

## 2021-02-04 PROCEDURE — 85027 COMPLETE CBC AUTOMATED: CPT

## 2021-02-04 PROCEDURE — 700102 HCHG RX REV CODE 250 W/ 637 OVERRIDE(OP): Performed by: INTERNAL MEDICINE

## 2021-02-04 PROCEDURE — 97165 OT EVAL LOW COMPLEX 30 MIN: CPT

## 2021-02-04 PROCEDURE — 36415 COLL VENOUS BLD VENIPUNCTURE: CPT

## 2021-02-04 PROCEDURE — 700111 HCHG RX REV CODE 636 W/ 250 OVERRIDE (IP): Performed by: STUDENT IN AN ORGANIZED HEALTH CARE EDUCATION/TRAINING PROGRAM

## 2021-02-04 PROCEDURE — 770020 HCHG ROOM/CARE - TELE (206)

## 2021-02-04 PROCEDURE — A9270 NON-COVERED ITEM OR SERVICE: HCPCS | Performed by: INTERNAL MEDICINE

## 2021-02-04 PROCEDURE — A9270 NON-COVERED ITEM OR SERVICE: HCPCS | Performed by: STUDENT IN AN ORGANIZED HEALTH CARE EDUCATION/TRAINING PROGRAM

## 2021-02-04 PROCEDURE — 81001 URINALYSIS AUTO W/SCOPE: CPT

## 2021-02-04 PROCEDURE — 80069 RENAL FUNCTION PANEL: CPT

## 2021-02-04 PROCEDURE — 97161 PT EVAL LOW COMPLEX 20 MIN: CPT

## 2021-02-04 PROCEDURE — 700111 HCHG RX REV CODE 636 W/ 250 OVERRIDE (IP): Performed by: INTERNAL MEDICINE

## 2021-02-04 PROCEDURE — 99232 SBSQ HOSP IP/OBS MODERATE 35: CPT | Performed by: INTERNAL MEDICINE

## 2021-02-04 PROCEDURE — 700105 HCHG RX REV CODE 258: Performed by: INTERNAL MEDICINE

## 2021-02-04 PROCEDURE — 700102 HCHG RX REV CODE 250 W/ 637 OVERRIDE(OP): Performed by: STUDENT IN AN ORGANIZED HEALTH CARE EDUCATION/TRAINING PROGRAM

## 2021-02-04 RX ORDER — AMOXICILLIN AND CLAVULANATE POTASSIUM 875; 125 MG/1; MG/1
1 TABLET, FILM COATED ORAL EVERY 12 HOURS
Status: DISCONTINUED | OUTPATIENT
Start: 2021-02-04 | End: 2021-02-05 | Stop reason: HOSPADM

## 2021-02-04 RX ORDER — LACTOBACILLUS RHAMNOSUS GG 10B CELL
1 CAPSULE ORAL
Status: DISCONTINUED | OUTPATIENT
Start: 2021-02-04 | End: 2021-02-05 | Stop reason: HOSPADM

## 2021-02-04 RX ADMIN — CEFTRIAXONE SODIUM 1 G: 1 INJECTION, POWDER, FOR SOLUTION INTRAMUSCULAR; INTRAVENOUS at 05:17

## 2021-02-04 RX ADMIN — Medication 1 CAPSULE: at 11:51

## 2021-02-04 RX ADMIN — TAMSULOSIN HYDROCHLORIDE 0.4 MG: 0.4 CAPSULE ORAL at 17:32

## 2021-02-04 RX ADMIN — ENOXAPARIN SODIUM 30 MG: 30 INJECTION SUBCUTANEOUS at 05:16

## 2021-02-04 RX ADMIN — AMOXICILLIN AND CLAVULANATE POTASSIUM 1 TABLET: 875; 125 TABLET, FILM COATED ORAL at 17:31

## 2021-02-04 RX ADMIN — DOCUSATE SODIUM 50 MG AND SENNOSIDES 8.6 MG 2 TABLET: 8.6; 5 TABLET, FILM COATED ORAL at 05:15

## 2021-02-04 RX ADMIN — GUAIFENESIN 600 MG: 600 TABLET, EXTENDED RELEASE ORAL at 05:16

## 2021-02-04 RX ADMIN — GUAIFENESIN 600 MG: 600 TABLET, EXTENDED RELEASE ORAL at 17:31

## 2021-02-04 RX ADMIN — ENOXAPARIN SODIUM 30 MG: 30 INJECTION SUBCUTANEOUS at 17:31

## 2021-02-04 RX ADMIN — DOCUSATE SODIUM 50 MG AND SENNOSIDES 8.6 MG 2 TABLET: 8.6; 5 TABLET, FILM COATED ORAL at 17:31

## 2021-02-04 RX ADMIN — ONDANSETRON 4 MG: 4 TABLET, ORALLY DISINTEGRATING ORAL at 19:45

## 2021-02-04 ASSESSMENT — COGNITIVE AND FUNCTIONAL STATUS - GENERAL
SUGGESTED CMS G CODE MODIFIER DAILY ACTIVITY: CH
DAILY ACTIVITIY SCORE: 24
CLIMB 3 TO 5 STEPS WITH RAILING: A LITTLE
MOBILITY SCORE: 23
SUGGESTED CMS G CODE MODIFIER MOBILITY: CI

## 2021-02-04 ASSESSMENT — ENCOUNTER SYMPTOMS
SPUTUM PRODUCTION: 0
DOUBLE VISION: 0
SHORTNESS OF BREATH: 0
CHILLS: 0
NAUSEA: 1
HEADACHES: 0
VOMITING: 0
FEVER: 0
PALPITATIONS: 0
COUGH: 0
DIARRHEA: 1
MYALGIAS: 0
BLURRED VISION: 0
SORE THROAT: 0
DIZZINESS: 0
BLOOD IN STOOL: 0
ABDOMINAL PAIN: 1

## 2021-02-04 ASSESSMENT — PAIN DESCRIPTION - PAIN TYPE
TYPE: ACUTE PAIN

## 2021-02-04 ASSESSMENT — GAIT ASSESSMENTS
GAIT LEVEL OF ASSIST: SUPERVISED
DISTANCE (FEET): 75
ASSISTIVE DEVICE: FRONT WHEEL WALKER

## 2021-02-04 ASSESSMENT — ACTIVITIES OF DAILY LIVING (ADL): TOILETING: INDEPENDENT

## 2021-02-04 ASSESSMENT — FIBROSIS 4 INDEX: FIB4 SCORE: 1.15

## 2021-02-04 NOTE — CARE PLAN
Problem: Pain Management  Goal: Pain level will decrease to patient's comfort goal  Outcome: PROGRESSING AS EXPECTED     Problem: Fluid Volume:  Goal: Will maintain balanced intake and output  Outcome: PROGRESSING AS EXPECTED     Problem: Communication  Goal: The ability to communicate needs accurately and effectively will improve  Outcome: PROGRESSING AS EXPECTED     Problem: Safety  Goal: Will remain free from injury  Outcome: PROGRESSING AS EXPECTED  Goal: Will remain free from falls  Outcome: PROGRESSING AS EXPECTED     Problem: Infection  Goal: Will remain free from infection  Outcome: PROGRESSING AS EXPECTED     Problem: Venous Thromboembolism (VTW)/Deep Vein Thrombosis (DVT) Prevention:  Goal: Patient will participate in Venous Thrombosis (VTE)/Deep Vein Thrombosis (DVT)Prevention Measures  Outcome: PROGRESSING AS EXPECTED     Problem: Bowel/Gastric:  Goal: Normal bowel function is maintained or improved  Outcome: PROGRESSING AS EXPECTED  Goal: Will not experience complications related to bowel motility  Outcome: PROGRESSING AS EXPECTED     Problem: Knowledge Deficit  Goal: Knowledge of disease process/condition, treatment plan, diagnostic tests, and medications will improve  Outcome: PROGRESSING AS EXPECTED  Goal: Knowledge of the prescribed therapeutic regimen will improve  Outcome: PROGRESSING AS EXPECTED     Problem: Discharge Barriers/Planning  Goal: Patient's continuum of care needs will be met  Outcome: PROGRESSING AS EXPECTED     Problem: Mobility  Goal: Risk for activity intolerance will decrease  Outcome: PROGRESSING AS EXPECTED     Problem: Skin Integrity  Goal: Risk for impaired skin integrity will decrease  Outcome: PROGRESSING AS EXPECTED

## 2021-02-04 NOTE — THERAPY
"Occupational Therapy   Initial Evaluation     Patient Name: Rahul Blanco  Age:  75 y.o., Sex:  female  Medical Record #: [id number]  Today's Date: 2/4/2021          Assessment  Patient is 75 y.o. female admitted for abdominal pain s/p RLQ/pelvic abscess drainage, found to be COVID (+), has history of arthritis w/ bilateral hand and feet deformities. Pt lives alone in a handicap accessible apartment, ambulates with a 4WW, independent with ADLs uses [employer] for grocery service, and a cleaning lady 1x/wk. Pt has family coming from [place] to assist her at discharge with IADLs and ADLs as needed. Pt able to complete all tasks with more time but no physical assistance, will recommend HHOT.     Plan    Recommend Occupational Therapy for Evaluation only.    DC Equipment Recommendations: None  Discharge Recommendations: Recommend home health for continued occupational therapy services     Subjective    \"I feel ready to go tomorrow when my grand daughter can help me\"     Objective       02/04/21 1312   Prior Living Situation   Prior Services Housekeeping / Homemaker Services;Other (Comments)  (grocery service)   Housing / Facility 1 Story Apartment / Condo   Steps Into Home 0   Steps In Home 0   Bathroom Set up Walk In Shower;Grab Bars;Shower Chair   Equipment Owned 4-Wheel Walker;Grab Bar(s) In Tub / Shower;Grab Bar(s) By Toilet;Tub / Shower Seat;Bed Side Commode   Lives with - Patient's Self Care Capacity Alone and Able to Care For Self   Prior Level of ADL Function   Self Feeding Independent   Grooming / Hygiene Independent   Bathing Independent   Dressing Independent   Toileting Independent   Prior Level of IADL Function   Medication Management Independent   Laundry Independent   Kitchen Mobility Independent   Finances Independent   Home Management Requires Assist   Shopping Requires Assist   Prior Level Of Mobility Independent With Device in Home;Independent With Device in Community   Driving / Transportation " Relatives / Others Provide Transportation   Occupation (Pre-Hospital Vocational) Retired Due To Age   History of Falls   History of Falls No   Pain 0 - 10 Group   Therapist Pain Assessment 0;Post Activity Pain Same as Prior to Activity   Non Verbal Descriptors   Non Verbal Scale  Calm   Cognition    Cognition / Consciousness WDL   Level of Consciousness Alert   Comments pleasant, cooperative   Active ROM Upper Body   Active ROM Upper Body  WDL   Dominant Hand Right   Strength Upper Body   Upper Body Strength  Not Tested   Comments Notable deformities d/t arthritis   Sensation Upper Body   Upper Extremity Sensation  WDL   Upper Body Muscle Tone   Upper Body Muscle Tone  WDL   Neurological Concerns   Neurological Concerns No   Coordination Upper Body   Coordination WDL   Balance Assessment   Sitting Balance (Static) Fair +   Sitting Balance (Dynamic) Fair +   Standing Balance (Static) Fair   Standing Balance (Dynamic) Fair   Weight Shift Sitting Good   Weight Shift Standing Good   Comments w/ FWW   Bed Mobility    Supine to Sit Supervised   Sit to Supine Supervised   Scooting Supervised   ADL Assessment   Grooming Supervision;Standing   Upper Body Dressing Supervision   Lower Body Dressing Supervision   Toileting Supervision   How much help from another person does the patient currently need...   Putting on and taking off regular lower body clothing? 4   Bathing (including washing, rinsing, and drying)? 4   Toileting, which includes using a toilet, bedpan, or urinal? 4   Putting on and taking off regular upper body clothing? 4   Taking care of personal grooming such as brushing teeth? 4   Eating meals? 4   6 Clicks Daily Activity Score 24   Functional Mobility   Sit to Stand Supervised   Bed, Chair, Wheelchair Transfer Supervised   Toilet Transfers Supervised   Transfer Method Stand Step   Mobility bed mobility, ambulating in hallway, bathroom mobility, BTB   Comments w/ FWW   Visual Perception   Visual Perception   WDL   Activity Tolerance   Sitting in Chair 5  (toilet)   Sitting Edge of Bed 5   Standing 5   Education Group   Education Provided Role of Occupational Therapist   Role of Occupational Therapist Patient Response Patient;Acceptance;Explanation   Interdisciplinary Plan of Care Collaboration   IDT Collaboration with  Nursing   Patient Position at End of Therapy In Bed;Call Light within Reach;Tray Table within Reach;Phone within Reach   Collaboration Comments RN updated

## 2021-02-04 NOTE — THERAPY
Physical Therapy   Initial Evaluation     Patient Name: Rahul Blanco  Age:  75 y.o., Sex:  female  Medical Record #: [id number]  Today's Date: 2/4/2021          Assessment  Patient is 75 y.o. female admitted w/ abdominal pain.  She is s/p appy, two weeks prior to admit.  Found to have a RLQ/pelvic abscess, s/p drainage.  She is also COVID+.  She has a hx of arthritis, w/ noted bilateral hand and feet deformities.  She lives alone in a handicapped apartment, where she is ambulatory w/ a 4ww.  Today, she is rec'd alert, in bed, agreeable to work w/ PT.  She is able to get in/out of bed w/o assist and w/o use of bed features.  She is able to ambulate w/ a fww w/o loss of balance or need of assist.  She reports that her granddaughter will be arriving tomorrow from [place] to stay w/ her for one week.  She would like to d/c home tomorrow after her granddaughter arrives.  No acute PT needs at this time, however she may benefit from home health to assess mobility in her own environment.  PT will be available for d/c needs.    Plan    Recommend Physical Therapy for Evaluation only  DC Equipment Recommendations: None  Discharge Recommendations: Recommend home health for continued physical therapy services         Objective       02/04/21 1041   Prior Living Situation   Housing / Facility 1 Story Apartment / Condo   Steps Into Home 0   Steps In Home 0   Equipment Owned 4-Wheel Walker;Grab Bar(s) In Tub / Shower;Grab Bar(s) By Toilet;Bed Side Commode   Lives with - Patient's Self Care Capacity Alone and Able to Care For Self   Prior Level of Functional Mobility   Bed Mobility Independent   Transfer Status Independent   Ambulation Independent   Assistive Devices Used 4-Wheel Walker   Cognition    Level of Consciousness Alert   Balance Assessment   Sitting Balance (Static) Fair +   Sitting Balance (Dynamic) Fair +   Standing Balance (Static) Fair   Standing Balance (Dynamic) Fair   Weight Shift Sitting Good   Weight Shift  Standing Good   Comments w/ fww   Gait Analysis   Gait Level Of Assist Supervised   Assistive Device Front Wheel Walker   Distance (Feet) 75   Bed Mobility    Supine to Sit Supervised   Sit to Supine Supervised   Scooting Supervised   Functional Mobility   Sit to Stand Supervised   Toilet Transfers Supervised   Anticipated Discharge Equipment and Recommendations   DC Equipment Recommendations None   Discharge Recommendations Recommend home health for continued physical therapy services

## 2021-02-04 NOTE — PROGRESS NOTES
Telemetry Shift Summary     Rhythm SR/ST  HR Range   Ectopy NA  Measurements .12/.08/.34       Normal Values  Rhythm SR  HR Range    Measurements 0.12-0.20 / 0.06-0.10  / 0.30-0.52    Ongoing care no acute issues at this time. Patient resting in bed safely with no c/o anything at this time. Will continue care and monitoring as ordered.

## 2021-02-04 NOTE — FACE TO FACE
Face to Face Supporting Documentation - Home Health    The encounter with this patient was in whole or in part the primary reason for home health admission.    Date of encounter:   Patient:                    MRN:                       YOB: 2020  Rahul Blanco  [id number]  1945     Home health to see patient for:  Skilled Nursing care for assessment, interventions & education, Home health aide, Physical Therapy evaluation and treatment and Occupational therapy evaluation and treatment    Skilled need for:  New Onset Medical Diagnosis postsurgical abscess    Skilled nursing interventions to include:  Line/Drain/Airway education and care    Homebound status evidenced by:  Need the aid of supportive devices such as crutches, canes, wheelchairs or walkers. Leaving home requires a considerable and taxing effort. There is a normal inability to leave the home.    Community Physician to provide follow up care: Pilar Chapa P.A.-C.     Optional Interventions? No      I certify the face to face encounter for this home health care referral meets the CMS requirements and the encounter/clinical assessment with the patient was, in whole, or in part, for the medical condition(s) listed above, which is the primary reason for home health care. Based on my clinical findings: the service(s) are medically necessary, support the need for home health care, and the homebound criteria are met.  I certify that this patient has had a face to face encounter by myself.  Franci Gavin D.O. - NPI: [id number]

## 2021-02-04 NOTE — DISCHARGE PLANNING
@1355  Agency/Facility Name: [facility]  Spoke To: Kendra  Outcome: Accepted and will deliver to hospital.    Received Choice form at 1155  Agency[facility]Facility Name: [facility]  Referral sent per Choice form @ 1155     @1140  [facility]Facility Name: [facility]  Spoke To: Intake  Outcome: Declined due to non-contracted insurance.    Received Choice form at 1025  Agency[facility]Facility Name: [facility]  Referral sent per Choice form @ 1029

## 2021-02-04 NOTE — DISCHARGE PLANNING
Anticipated Discharge Disposition: Home with family support and HHC    Action:   · Completed chart review and discussed pt's POC in IDT rounds  · Pt on room air  · Pt Covid + 1/28/2021  · RN CM spoke with pt at bedside regarding discharge planning and IMM. Pt called her daughter for assistance with decision making. RN CM spoke with the pt's daughter, Smiley, via phone. Provided education regarding HHC and IMM. Obtained verbal consent for blanket DME for [facility], choice for HHC: 1. [facility], 2. [facility]. 3. [facility]. Faxed choice to [facility].   · RN CM provided education and obtained verbal consent for 2nd IMM. Charted IMM in flow sheet, provided patient with a copy of the IMM, and placed copy in the medical records bin.    · Assessment notes: pt's spouse past 3 weeks prior to hospitalization. Pt lives in a single story apartment alone. Pt has a FWW at home. Smiley states pt's niece is able to provide 24/7 care starting tomorrow, 2/5/2021. Pt's son, Chico Blanco is able to provide transportation tomorrow at 0930. Chico's contact number is [phone].    Barriers to Discharge:   · Pending [facility] acceptance     Plan:   · Continue to collaborate with the pt, pt's family, and health care team to provide social and discharge support as needed.      Addendum    1302: RN CM completed Aging and Disability referral, faxed to [facility], and emailed a copy to the pt's [email or web address] at [email or web address]    7606 Per Jose F, pt is accepted to [facility].

## 2021-02-04 NOTE — CARE PLAN
Assumed care of patient at 0710. Received bedside report from night shift RN. Bed is locked and lowest position, call light within reach. Treaded socks in place. Patient updated on plan of care, no complaints or pain at this time. White board updated. Pt AxOx4 Patient breathing pattern is unlabored. Pt is medical. All needs met at this time. Will continue care and monitoring as ordered.

## 2021-02-04 NOTE — DISCHARGE PLANNING
Received Choice form at 0414  Agency/Facility Name: [facility]  Referral sent per Choice form @ 2789

## 2021-02-04 NOTE — FACE TO FACE
Face to Face Note  -  Durable Medical Equipment    Franci Gavin D.O. - NPI: [id number]  I certify that this patient is under my care and that they had a durable medical equipment(DME)face to face encounter by myself that meets the physician DME face-to-face encounter requirements with this patient on:    Date of encounter:   Patient:                    MRN:                       YOB: 2020  Rahul Blanco  [id number]  1945     The encounter with the patient was in whole, or in part, for the following medical condition, which is the primary reason for durable medical equipment:  Post-Op Surgery    I certify that, based on my findings, the following durable medical equipment is medically necessary:  Other DME Equipment - bedside commode.    HOME O2 Saturation Measurements:(Values must be present for Home Oxygen orders)         ,     ,         My Clinical findings support the need for the above equipment due to:  Abnormal Gait    Supporting Symptoms: weakness    If patient feels more short of breath, they can go up to 6 liters per minute and contact healthcare provider.

## 2021-02-04 NOTE — PROGRESS NOTES
"Surgery    Events  1/31/21: feeling a little better today, no pain at rest, no N/V, tolerating liquids, gets up to bathroom  2/1/21: feeling a better today, no pain at rest, no N/V, tolerating PO, gets up to bathroom  2/2/21: feeling better today, no pain at rest, she says \"occsional stomach upset\", tolerating PO, gets up to bathroom   2/3/21:  Patient says she \"turned the corner today\" in a good way, tolerated oatmeal for breakfast, no nausea or vomiting, +BM  2/4/21: patient feeling well today, tolerating PO, +BM    Vitals  /100   Pulse (!) 102   Temp 35.8 °C (96.5 °F) (Temporal)   Resp 18   Ht 1.651 m (5' 5\")   Wt 74.4 kg (164 lb 0.4 oz)   SpO2 95%   Breastfeeding No   BMI 27.29 kg/m²     Exam  Alert, no distress  Abdomen soft, nondistended, minimal TTP    Labs  WBC 13 -> 12 -> 11 -> normal x3  Hgb stable around 12  Creatinine normal    A/P)  1/13: [facility]  1/29: IR drains placed for RLQ and pelvic abscesses  2/3: drains removed    Improving  Regular diet  Up as tolerated  Continue antibiotics  Home on ABx anytime from surgical standpoint    Appreciate hospitalist support  Following along    Ed Stoll MD  [facility] (General and Vascular Surgery)  Cell: [phone] (text or call is fine, if you don't reach me please try my office)  Office: [phone]  __________________________________________________________________  Patient:Rahul Blanco   MRN:[id number]   CSN:[id number]    "

## 2021-02-04 NOTE — CARE PLAN
Problem: Psychosocial Needs:  Goal: Level of anxiety will decrease  Outcome: PROGRESSING AS EXPECTED

## 2021-02-05 ENCOUNTER — APPOINTMENT (OUTPATIENT)
Dept: RADIOLOGY | Facility: MEDICAL CENTER | Age: 76
DRG: 862 | End: 2021-02-05
Attending: INTERNAL MEDICINE
Payer: MEDICARE

## 2021-02-05 ENCOUNTER — PHARMACY VISIT (OUTPATIENT)
Dept: PHARMACY | Facility: MEDICAL CENTER | Age: 76
End: 2021-02-05
Payer: COMMERCIAL

## 2021-02-05 VITALS
HEART RATE: 107 BPM | BODY MASS INDEX: 26.01 KG/M2 | HEIGHT: 65 IN | DIASTOLIC BLOOD PRESSURE: 95 MMHG | SYSTOLIC BLOOD PRESSURE: 146 MMHG | OXYGEN SATURATION: 97 % | TEMPERATURE: 97.3 F | RESPIRATION RATE: 16 BRPM | WEIGHT: 156.09 LBS

## 2021-02-05 PROBLEM — E87.6 HYPOKALEMIA: Status: RESOLVED | Noted: 2021-01-29 | Resolved: 2021-02-05

## 2021-02-05 PROBLEM — T81.43XA POSTPROCEDURAL INTRAABDOMINAL ABSCESS: Status: RESOLVED | Noted: 2021-01-29 | Resolved: 2021-02-05

## 2021-02-05 LAB
D DIMER PPP IA.FEU-MCNC: >20 UG/ML (FEU) (ref 0–0.5)
EKG IMPRESSION: NORMAL

## 2021-02-05 PROCEDURE — A9270 NON-COVERED ITEM OR SERVICE: HCPCS | Performed by: INTERNAL MEDICINE

## 2021-02-05 PROCEDURE — 700102 HCHG RX REV CODE 250 W/ 637 OVERRIDE(OP): Performed by: STUDENT IN AN ORGANIZED HEALTH CARE EDUCATION/TRAINING PROGRAM

## 2021-02-05 PROCEDURE — 700111 HCHG RX REV CODE 636 W/ 250 OVERRIDE (IP): Performed by: STUDENT IN AN ORGANIZED HEALTH CARE EDUCATION/TRAINING PROGRAM

## 2021-02-05 PROCEDURE — 700117 HCHG RX CONTRAST REV CODE 255: Performed by: INTERNAL MEDICINE

## 2021-02-05 PROCEDURE — RXMED WILLOW AMBULATORY MEDICATION CHARGE: Performed by: INTERNAL MEDICINE

## 2021-02-05 PROCEDURE — 99239 HOSP IP/OBS DSCHRG MGMT >30: CPT | Performed by: INTERNAL MEDICINE

## 2021-02-05 PROCEDURE — 93005 ELECTROCARDIOGRAM TRACING: CPT | Performed by: INTERNAL MEDICINE

## 2021-02-05 PROCEDURE — A9270 NON-COVERED ITEM OR SERVICE: HCPCS | Performed by: STUDENT IN AN ORGANIZED HEALTH CARE EDUCATION/TRAINING PROGRAM

## 2021-02-05 PROCEDURE — 93970 EXTREMITY STUDY: CPT

## 2021-02-05 PROCEDURE — 93010 ELECTROCARDIOGRAM REPORT: CPT | Performed by: INTERNAL MEDICINE

## 2021-02-05 PROCEDURE — 36415 COLL VENOUS BLD VENIPUNCTURE: CPT

## 2021-02-05 PROCEDURE — 71275 CT ANGIOGRAPHY CHEST: CPT

## 2021-02-05 PROCEDURE — 700102 HCHG RX REV CODE 250 W/ 637 OVERRIDE(OP): Performed by: INTERNAL MEDICINE

## 2021-02-05 PROCEDURE — 85379 FIBRIN DEGRADATION QUANT: CPT

## 2021-02-05 RX ORDER — LORAZEPAM 0.5 MG/1
0.5 TABLET ORAL EVERY 6 HOURS PRN
Status: DISCONTINUED | OUTPATIENT
Start: 2021-02-05 | End: 2021-02-05 | Stop reason: HOSPADM

## 2021-02-05 RX ORDER — APIXABAN 5 MG (74)
KIT ORAL
Qty: 74 EACH | Refills: 0 | Status: SHIPPED | OUTPATIENT
Start: 2021-02-05 | End: 2021-03-07

## 2021-02-05 RX ORDER — TAMSULOSIN HYDROCHLORIDE 0.4 MG/1
0.4 CAPSULE ORAL
Qty: 30 CAP | Refills: 0 | Status: SHIPPED | OUTPATIENT
Start: 2021-02-05

## 2021-02-05 RX ORDER — FLUOXETINE 10 MG/1
10 CAPSULE ORAL DAILY
Status: DISCONTINUED | OUTPATIENT
Start: 2021-02-05 | End: 2021-02-05 | Stop reason: HOSPADM

## 2021-02-05 RX ORDER — AMOXICILLIN AND CLAVULANATE POTASSIUM 875; 125 MG/1; MG/1
1 TABLET, FILM COATED ORAL EVERY 12 HOURS
Qty: 10 TAB | Refills: 0 | Status: SHIPPED | OUTPATIENT
Start: 2021-02-05 | End: 2021-02-10

## 2021-02-05 RX ADMIN — GUAIFENESIN 600 MG: 600 TABLET, EXTENDED RELEASE ORAL at 05:12

## 2021-02-05 RX ADMIN — APIXABAN 10 MG: 5 TABLET, FILM COATED ORAL at 17:12

## 2021-02-05 RX ADMIN — Medication 1 CAPSULE: at 08:03

## 2021-02-05 RX ADMIN — ENOXAPARIN SODIUM 30 MG: 30 INJECTION SUBCUTANEOUS at 05:12

## 2021-02-05 RX ADMIN — AMOXICILLIN AND CLAVULANATE POTASSIUM 1 TABLET: 875; 125 TABLET, FILM COATED ORAL at 05:12

## 2021-02-05 RX ADMIN — GUAIFENESIN 600 MG: 600 TABLET, EXTENDED RELEASE ORAL at 17:49

## 2021-02-05 RX ADMIN — DOCUSATE SODIUM 50 MG AND SENNOSIDES 8.6 MG 2 TABLET: 8.6; 5 TABLET, FILM COATED ORAL at 17:49

## 2021-02-05 RX ADMIN — TAMSULOSIN HYDROCHLORIDE 0.4 MG: 0.4 CAPSULE ORAL at 17:49

## 2021-02-05 RX ADMIN — IOHEXOL 50 ML: 350 INJECTION, SOLUTION INTRAVENOUS at 12:47

## 2021-02-05 RX ADMIN — FLUOXETINE 10 MG: 10 CAPSULE ORAL at 11:59

## 2021-02-05 RX ADMIN — AMOXICILLIN AND CLAVULANATE POTASSIUM 1 TABLET: 875; 125 TABLET, FILM COATED ORAL at 17:49

## 2021-02-05 RX ADMIN — DOCUSATE SODIUM 50 MG AND SENNOSIDES 8.6 MG 2 TABLET: 8.6; 5 TABLET, FILM COATED ORAL at 05:12

## 2021-02-05 ASSESSMENT — PAIN DESCRIPTION - PAIN TYPE
TYPE: ACUTE PAIN
TYPE: ACUTE PAIN

## 2021-02-05 ASSESSMENT — FIBROSIS 4 INDEX: FIB4 SCORE: 1.15

## 2021-02-05 NOTE — PROGRESS NOTES
"Called Chico Blanco [phone] about discharge planning for his mother tomorrow.  No answer.  Will try again later.  Asked patient to call her son and make sure he is aware of the plan and to be prepared at approximately 0900.  Patient has requested help getting dressed in the morning at 0700 but has declined a bath.  \"I would like to wait till I get home to shower.\"     "

## 2021-02-05 NOTE — PROGRESS NOTES
Assumed patient care. Patient not in distress. Patient A&Ox4. On room air. Tele box on. Safety precautions in place. Call light and personal belongings within reach. Educated to call for assistance.

## 2021-02-05 NOTE — PROGRESS NOTES
Hospital Medicine Daily Progress Note    Date of Service  2/4/2021    Chief Complaint  Abdominal pain    Hospital Course  75F s/p appendectomy 1/13/21 presents with RLQ abdominal pain that has been worsening associated with vomitless nausea, denies fevers. In the ED CTAP showed likely abscess associated with prior appendectomy and patient started on IV abx. IR placed RLQ and pelvic drains on 1/29. General surgery was consulted. On initial CT scan patient was also found to have a 2 mm calcification poster to the bladder on the left with mild left hydronephrosis and hydroureter. Her kidney function is stable and she denies any pain, discussed with urology who recommended follow up outpatient.     Interval Problem Update  No acute events overnight.  Patient is feeling much better this morning.  Called and spoke with patient's daughter who has been concerned about patient's care at home, she states she has found a family member that is comfortable staying with her.  I have ordered home health and a walker for her.  Discussed with general surgery, she is clear for discharge from their standpoint, they would like for patient to have 5 days of Augmentin on discharge.  Repeat UA negative.    Consultants/Specialty  IR  GS    Code Status  Full Code    Disposition  Home when medically clear    Review of Systems  Review of Systems   Constitutional: Negative for chills and fever.   HENT: Negative for congestion and sore throat.    Eyes: Negative for blurred vision and double vision.   Respiratory: Negative for cough, sputum production and shortness of breath.    Cardiovascular: Negative for chest pain and palpitations.   Gastrointestinal: Positive for abdominal pain, diarrhea and nausea. Negative for blood in stool, melena and vomiting.   Genitourinary: Negative for dysuria and frequency.   Musculoskeletal: Negative for myalgias.   Neurological: Negative for dizziness and headaches.        Physical Exam  Temp:  [35.8 °C (96.5  °F)-36.9 °C (98.5 °F)] 36 °C (96.8 °F)  Pulse:  [] 108  Resp:  [16-18] 18  BP: (130-156)/() 156/110  SpO2:  [93 %-97 %] 94 %    Physical Exam  Constitutional:       General: She is not in acute distress.     Appearance: She is not toxic-appearing.   HENT:      Head: Normocephalic.   Eyes:      Extraocular Movements: Extraocular movements intact.      Conjunctiva/sclera: Conjunctivae normal.   Cardiovascular:      Rate and Rhythm: Normal rate and regular rhythm.   Pulmonary:      Effort: Pulmonary effort is normal. No respiratory distress.      Breath sounds: Normal breath sounds.   Abdominal:      Palpations: Abdomen is soft.      Tenderness: There is abdominal tenderness (mild). There is no guarding or rebound.   Musculoskeletal: Normal range of motion.         General: No swelling.   Skin:     General: Skin is warm and dry.   Neurological:      General: No focal deficit present.      Mental Status: She is alert and oriented to person, place, and time.   Psychiatric:         Mood and Affect: Mood normal.         Behavior: Behavior normal.         Fluids    Intake/Output Summary (Last 24 hours) at 2/4/2021 1635  Last data filed at 2/4/2021 0710  Gross per 24 hour   Intake 400 ml   Output --   Net 400 ml       Laboratory  Recent Labs     02/02/21  0614 02/04/21  0549   WBC 8.3 6.1   RBC 4.55 4.98   HEMOGLOBIN 12.6 13.8   HEMATOCRIT 39.0 43.1   MCV 85.7 86.5   MCH 27.7 27.7   MCHC 32.3* 32.0*   RDW 44.5 44.4   PLATELETCT 316 304   MPV 9.0 9.4     Recent Labs     02/02/21  0614 02/04/21  0549   SODIUM 137 134*   POTASSIUM 4.0 3.8   CHLORIDE 106 102   CO2 23 25   GLUCOSE 110* 110*   BUN 4* 6*   CREATININE 0.26* 0.29*   CALCIUM 8.4* 8.5                   Imaging  EC-ECHOCARDIOGRAM LTD W/O CONT   Final Result      CT-DRAIN-PERITONEAL   Final Result      1.  Successful CT-guided RIGHT upper quadrant peritoneal drainage tube placement.   2.  Successful CT-guided transgluteal peritoneal drainage tube placement.       Plan: Twice daily flushes with 10 mL of sterile saline. Monitor outputs. Please contact interventional radiology if there is any concern for tube dysfunction.         US-RENAL   Final Result      1.  Mild left hydronephrosis without definite distal obstructive etiology identified.   2.  1.7 cm right renal cyst.      US-EXTREMITY VENOUS UPPER BILAT   Final Result      US-EXTREMITY VENOUS LOWER BILAT   Final Result      CT-ABDOMEN-PELVIS W/O   Final Result         1.  2 mm calcification posterior to the bladder on the left with mild left urinary outflow tract obstructive changes, appearance suggests ureterovesicular junction stone   2.  Structure in the right lower quadrant with surgical clips, appearance suggests right lower quadrant abscess.   3.  Probable loculated fluid collection in the posterior pelvis, appearance concerning for abscess. Could be more definitively characterized with CT pelvis with contrast.   4.  Moderate size hiatal hernia   5.  Small to moderate pericardial effusion.   6.  Cholelithiasis   7.  Atherosclerosis and atherosclerotic coronary artery disease.      These findings were discussed with the patient's clinician, Chico Kennedy, on 1/28/2021 9:10 PM.           Assessment/Plan  * Postprocedural intraabdominal abscess- (present on admission)  Assessment & Plan  Surgery: Dr. Stoll at [place] who performed original appendectomy ~2 weeks prior to admission  IR drain placed in RLQ abscess and pelvic fluid collection on 01/30  Blood culture: no growth to date  Abscess/fluid e.coli  Continue [place]   , Dr. Stoll following, drain removed 2/3, clear for dc on 5 days of augmentin       Renal stone  Assessment & Plan    2 mm calcification posterior to the bladder on the left with mild left urinary outflow tract obstructive changes, appearance suggests ureterovesicular junction stone.  Renal Ultrasound demonstrates mild hydro  Discussed with Dr. Brown, urology, ok to follow up as  outpatient as long as not septic stone    UA negative for infection     Hypokalemia- (present on admission)  Assessment & Plan  K-dur and IVPB given for total of 60mEq upon admission  Repeat bmp in AM  Supplement prn.    Rheumatoid arthritis (HCC)- (present on admission)  Assessment & Plan  Chronic, stable  Not on DMARD therapy    COVID-19- (present on admission)  Assessment & Plan  Patient is on room air, no shortness of breath, no cough  No indication for decadron  Monitor closely       VTE prophylaxis: Lovenox subq    I have performed a physical exam and reviewed and updated ROS and Plan today (2/4/2021). In review of yesterday's note (2/3/2021), there are no changes except as documented above.

## 2021-02-05 NOTE — PROGRESS NOTES
Telemetry Shift Summary     Rhythm SR/ST  HR Range   Ectopy rPVC  Measurements .16/.08/.34       Normal Values  Rhythm SR  HR Range    Measurements 0.12-0.20 / 0.06-0.10  / 0.30-0.52    Ongoing care no acute issues at this time. Patient resting in bed safely with no c/o anything at this time. Will continue care and monitoring as ordered. Pt to be DC tomorrow with home health if approved.

## 2021-02-05 NOTE — DISCHARGE SUMMARY
Discharge Summary    CHIEF COMPLAINT ON ADMISSION  Chief Complaint   Patient presents with   • Abdominal Pain       Reason for Admission  Postprocedural intraabdominal abscess     Admission Date  1/28/2021    CODE STATUS  Full Code    HPI & HOSPITAL COURSE  This is a 75 y.o. female here with abdominal pain.    75F s/p appendectomy 1/13/21 presents with RLQ abdominal pain that has been worsening associated with vomitless nausea, denies fevers. In the ED CTAP showed likely abscess associated with prior appendectomy and patient started on IV abx. IR placed RLQ and pelvic drains on 1/29. General surgery was consulted. On initial CT scan patient was also found to have a 2 mm calcification poster to the bladder on the left with mild left hydronephrosis and hydroureter. Her kidney function is stable and she denies any pain, discussed with urology who recommended follow up outpatient, since UA negative for infection. General surgery removed the drain and cleared the patient for discharge with 5 more days of Augmentin. Patient had worsening sinus tachycardia, ddimer >20, CTA negative for PE, venous US only showed DVT in right cephalic vein.  Due to her high risk given covid and recent surgery, discussed options with the patient who was agreeable to anticoagulation and started Eliquis. Likely will need only 1 month AC, but she is to follow up with her primary care physician for further monitoring. Patient continues to be tachycardic but requests to be discharged home to follow up with her PCP, other vitals stable. Patient set up for home health and has been given a bedside commode, she is to return to the ER if her condition worsens.     Therefore, she is discharged in fair and stable condition to home with organized home healthcare and close outpatient follow-up.    The patient met 2-midnight criteria for an inpatient stay at the time of discharge.    Discharge Date  2/5/2021    FOLLOW UP ITEMS POST DISCHARGE  Follow up with  primary care.   Follow up with urology.     DISCHARGE DIAGNOSES  Principal Problem (Resolved):    Postprocedural intraabdominal abscess POA: Yes  Active Problems:    Rheumatoid arthritis (HCC) POA: Yes      Overview: ICD-10 transition    Renal stone POA: Unknown    COVID-19 POA: Yes  Resolved Problems:    Hypokalemia POA: Yes      FOLLOW UP  No future appointments.  Pilar Chapa P.A.-C.  [address]  [address]  [place] [id number] [phone]    In 3 days  post hospitalization follow up, medicaiton reconciliation    Yeyo Brown M.D.  [address]  [place] [address]  [phone]    In 1 week  2mm UV stone      MEDICATIONS ON DISCHARGE     Medication List      START taking these medications      Instructions   amoxicillin-clavulanate 875-125 MG Tabs  Commonly known as: AUGMENTIN   Take 1 Tab by mouth every 12 hours for 5 days.  Dose: 1 Tab     apixaban 5mg Tabs  Start taking on: February 5, 2021  Commonly known as: ELIQUIS   Take 2 Tabs by mouth 2 Times a Day for 7 days, THEN 1 Tab 2 Times a Day for 23 days. Indications: DVT/PE     tamsulosin 0.4 MG capsule  Commonly known as: FLOMAX   Take 1 Cap by mouth 1/2 hour after dinner.  Dose: 0.4 mg        CONTINUE taking these medications      Instructions   ascorbic acid 500 MG Tabs  Commonly known as: ascorbic acid   Take 500 mg by mouth every day.  Dose: 500 mg     erythromycin 5 MG/GM Oint   Apply 1 Application to both eyes. Apply 2-3 times a week  Dose: 1 Application     FLUoxetine 10 MG Caps  Commonly known as: PROZAC   Take 1 Cap by mouth every day.  Dose: 10 mg     GARLIC   Take 1,000 mg by mouth every day.  Dose: 1,000 mg     ibuprofen 800 MG Tabs  Commonly known as: MOTRIN   Take 800 mg by mouth 2 (two) times a day.  Dose: 800 mg     multivitamin Tabs   Take 1 Tab by mouth every day.  Dose: 1 Tab        STOP taking these medications    ALPRAZolam 0.5 MG Tabs  Commonly known as: XANAX     clotrimazole-betamethasone 1-0.05 % Crea  Commonly known as:  LOTRISONE     ergocalciferol 25013 UNIT capsule  Commonly known as: DRISDOL     omeprazole 40 MG delayed-release capsule  Commonly known as: PRILOSEC            Allergies  Allergies   Allergen Reactions   • Aspirin Anaphylaxis   • Bloodless      Per patients [facility]   • Ciprofloxacin Hydrochloride Vomiting   • Hydrocodone      Pt states that she experienced hallucinations when taking this medication. Noted 01/28/21   • Toradol [Ketorolac Tromethamine]      Pt states that she experienced hallucinations when taking this medication. Noted 01/28/21       DIET  Orders Placed This Encounter   Procedures   • Diet Order Diet: Regular     Standing Status:   Standing     Number of Occurrences:   1     Order Specific Question:   Diet:     Answer:   Regular [1]       ACTIVITY  As tolerated.  Weight bearing as tolerated    CONSULTATIONS  General Surgery   Interventional Radiology     PROCEDURES  RLQ and pelvic drains CT guided    LABORATORY  Lab Results   Component Value Date    SODIUM 134 (L) 02/04/2021    POTASSIUM 3.8 02/04/2021    CHLORIDE 102 02/04/2021    CO2 25 02/04/2021    GLUCOSE 110 (H) 02/04/2021    BUN 6 (L) 02/04/2021    CREATININE 0.29 (L) 02/04/2021    CREATININE 0.6 01/27/2009        Lab Results   Component Value Date    WBC 6.1 02/04/2021    HEMOGLOBIN 13.8 02/04/2021    HEMATOCRIT 43.1 02/04/2021    PLATELETCT 304 02/04/2021        Total time of the discharge process exceeds 40 minutes.

## 2021-02-05 NOTE — PROGRESS NOTES
Patient alternates between resting in bed and sitting at the side of the bed all morning. She went down for a CTA. Left a message with security for them to deliver her personal belongings.

## 2021-02-05 NOTE — PROGRESS NOTES
Tele notified RN of change is HR at approximately 0105.  MD notified.  Waiting for response.  No new orders currently

## 2021-02-06 NOTE — PROGRESS NOTES
Verified with the patient she all of her personal belongings have been returned to her. We went through all her items. She verified she has everything. She verified she is not missing anything.

## 2021-02-06 NOTE — DISCHARGE INSTRUCTIONS
Discharge Instructions    Discharged to home by car with relative. Discharged via wheelchair, hospital escort: Yes.  Special equipment needed: Not Applicable    Be sure to schedule a follow-up appointment with your primary care doctor or any specialists as instructed.     Discharge Plan:   Influenza Vaccine Indication: Patient Refuses    I understand that a diet low in cholesterol, fat, and sodium is recommended for good health. Unless I have been given specific instructions below for another diet, I accept this instruction as my diet prescription.   Other diet:     Special Instructions: None    · Is patient discharged on Warfarin / Coumadin?   No     Depression / Suicide Risk    As you are discharged from this [facility] facility, it is important to learn how to keep safe from harming yourself.    Recognize the warning signs:  · Abrupt changes in personality, positive or negative- including increase in energy   · Giving away possessions  · Change in eating patterns- significant weight changes-  positive or negative  · Change in sleeping patterns- unable to sleep or sleeping all the time   · Unwillingness or inability to communicate  · Depression  · Unusual sadness, discouragement and loneliness  · Talk of wanting to die  · Neglect of personal appearance   · Rebelliousness- reckless behavior  · Withdrawal from people/activities they love  · Confusion- inability to concentrate     If you or a loved one observes any of these behaviors or has concerns about self-harm, here's what you can do:  · Talk about it- your feelings and reasons for harming yourself  · Remove any means that you might use to hurt yourself (examples: pills, rope, extension cords, firearm)  · Get professional help from the community (Mental Health, Substance Abuse, psychological counseling)  · Do not be alone:Call your Safe Contact- someone whom you trust who will be there for you.  · Call your local CRISIS HOTLINE 495-7218 or  633-394-3923  · Call your local Children's Mobile Crisis Response Team [place] (823) 271-9189 or www.H?REL[employer]  · Call the toll free National Suicide Prevention Hotlines   · National Suicide Prevention Lifeline 259-769-LQZI (8010)  · National [employer] Line Network 800-SUICIDE (002-4958)

## 2021-02-27 ENCOUNTER — HOSPITAL ENCOUNTER (OUTPATIENT)
Dept: HOSPITAL 8 - LAB | Age: 76
Discharge: HOME | End: 2021-02-27
Attending: FAMILY MEDICINE
Payer: MEDICARE

## 2021-02-27 DIAGNOSIS — R73.9: Primary | ICD-10-CM

## 2021-02-27 DIAGNOSIS — R10.13: ICD-10-CM

## 2021-02-27 LAB
ALBUMIN SERPL-MCNC: 3.2 G/DL (ref 3.4–5)
ALP SERPL-CCNC: 76 U/L (ref 45–117)
ALT SERPL-CCNC: 28 U/L (ref 12–78)
ANION GAP SERPL CALC-SCNC: 6 MMOL/L (ref 5–15)
BASOPHILS # BLD AUTO: 0 X10^3/UL (ref 0–0.1)
BASOPHILS NFR BLD AUTO: 0 % (ref 0–1)
BILIRUB SERPL-MCNC: 1.2 MG/DL (ref 0.2–1)
CALCIUM SERPL-MCNC: 8.7 MG/DL (ref 8.5–10.1)
CHLORIDE SERPL-SCNC: 113 MMOL/L (ref 98–107)
CREAT SERPL-MCNC: 0.45 MG/DL (ref 0.55–1.02)
EOSINOPHIL # BLD AUTO: 0 X10^3/UL (ref 0–0.4)
EOSINOPHIL NFR BLD AUTO: 1 % (ref 1–7)
ERYTHROCYTE [DISTWIDTH] IN BLOOD BY AUTOMATED COUNT: 16.1 % (ref 9.6–15.2)
EST. AVERAGE GLUCOSE BLD GHB EST-MCNC: 111 MG/DL (ref 0–126)
LYMPHOCYTES # BLD AUTO: 1.9 X10^3/UL (ref 1–3.4)
LYMPHOCYTES NFR BLD AUTO: 37 % (ref 22–44)
MCH RBC QN AUTO: 29.4 PG (ref 27–34.8)
MCHC RBC AUTO-ENTMCNC: 34 G/DL (ref 32.4–35.8)
MD: NO
MONOCYTES # BLD AUTO: 0.3 X10^3/UL (ref 0.2–0.8)
MONOCYTES NFR BLD AUTO: 7 % (ref 2–9)
NEUTROPHILS # BLD AUTO: 2.8 X10^3/UL (ref 1.8–6.8)
NEUTROPHILS NFR BLD AUTO: 55 % (ref 42–75)
PLATELET # BLD AUTO: 206 X10^3/UL (ref 130–400)
PMV BLD AUTO: 8 FL (ref 7.4–10.4)
PROT SERPL-MCNC: 7.5 G/DL (ref 6.4–8.2)
RBC # BLD AUTO: 4.89 X10^6/UL (ref 3.82–5.3)

## 2021-02-27 PROCEDURE — 83036 HEMOGLOBIN GLYCOSYLATED A1C: CPT

## 2021-02-27 PROCEDURE — 36415 COLL VENOUS BLD VENIPUNCTURE: CPT

## 2021-02-27 PROCEDURE — 85025 COMPLETE CBC W/AUTO DIFF WBC: CPT

## 2021-02-27 PROCEDURE — 80053 COMPREHEN METABOLIC PANEL: CPT

## 2021-05-07 ENCOUNTER — HOSPITAL ENCOUNTER (EMERGENCY)
Dept: HOSPITAL 8 - ED | Age: 76
Discharge: HOME | End: 2021-05-07
Payer: MEDICARE

## 2021-05-07 VITALS — HEIGHT: 64 IN | BODY MASS INDEX: 24.46 KG/M2 | WEIGHT: 143.3 LBS

## 2021-05-07 VITALS — DIASTOLIC BLOOD PRESSURE: 94 MMHG | SYSTOLIC BLOOD PRESSURE: 160 MMHG

## 2021-05-07 DIAGNOSIS — S09.90XA: Primary | ICD-10-CM
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
## 2021-05-07 DIAGNOSIS — M06.9: ICD-10-CM
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
## 2021-05-07 DIAGNOSIS — I10: ICD-10-CM
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
## 2021-05-07 PROCEDURE — 70450 CT HEAD/BRAIN W/O DYE: CPT

## 2021-05-07 PROCEDURE — 99284 EMERGENCY DEPT VISIT MOD MDM: CPT

## 2021-05-07 NOTE — NUR
RECEIVED REPORT FROM ALFREDO OROURKE. ASSUMING CARE AT THIS TIME. PT RESTING 
COMFORTABLY ON GURNEY. GLORIA.